# Patient Record
Sex: FEMALE | URBAN - METROPOLITAN AREA
[De-identification: names, ages, dates, MRNs, and addresses within clinical notes are randomized per-mention and may not be internally consistent; named-entity substitution may affect disease eponyms.]

---

## 2018-11-07 ENCOUNTER — APPOINTMENT (RX ONLY)
Dept: URBAN - METROPOLITAN AREA CLINIC 29 | Facility: CLINIC | Age: 43
Setting detail: DERMATOLOGY
End: 2018-11-07

## 2018-11-07 DIAGNOSIS — L81.4 OTHER MELANIN HYPERPIGMENTATION: ICD-10-CM

## 2018-11-07 DIAGNOSIS — D22 MELANOCYTIC NEVI: ICD-10-CM

## 2018-11-07 DIAGNOSIS — Z00.00 ENCOUNTER FOR GENERAL ADULT MEDICAL EXAMINATION WITHOUT ABNORMAL FINDINGS: ICD-10-CM

## 2018-11-07 DIAGNOSIS — Z71.89 OTHER SPECIFIED COUNSELING: ICD-10-CM

## 2018-11-07 PROBLEM — Z71.1 PERSON WITH FEARED HEALTH COMPLAINT IN WHOM NO DIAGNOSIS IS MADE: Status: ACTIVE | Noted: 2018-11-07

## 2018-11-07 PROBLEM — J45.909 UNSPECIFIED ASTHMA, UNCOMPLICATED: Status: ACTIVE | Noted: 2018-11-07

## 2018-11-07 PROBLEM — D22.61 MELANOCYTIC NEVI OF RIGHT UPPER LIMB, INCLUDING SHOULDER: Status: ACTIVE | Noted: 2018-11-07

## 2018-11-07 PROBLEM — L55.1 SUNBURN OF SECOND DEGREE: Status: ACTIVE | Noted: 2018-11-07

## 2018-11-07 PROBLEM — F41.9 ANXIETY DISORDER, UNSPECIFIED: Status: ACTIVE | Noted: 2018-11-07

## 2018-11-07 PROCEDURE — ? OTHER

## 2018-11-07 PROCEDURE — ? COUNSELING

## 2018-11-07 PROCEDURE — 99202 OFFICE O/P NEW SF 15 MIN: CPT

## 2018-11-07 PROCEDURE — ? SUNSCREEN RECOMMENDATIONS

## 2018-11-07 ASSESSMENT — LOCATION ZONE DERM
LOCATION ZONE: FACE
LOCATION ZONE: ARM
LOCATION ZONE: SCALP

## 2018-11-07 ASSESSMENT — LOCATION DETAILED DESCRIPTION DERM
LOCATION DETAILED: RIGHT DISTAL DORSAL FOREARM
LOCATION DETAILED: MEDIAL FRONTAL SCALP
LOCATION DETAILED: LEFT MEDIAL FOREHEAD

## 2018-11-07 ASSESSMENT — LOCATION SIMPLE DESCRIPTION DERM
LOCATION SIMPLE: LEFT FOREHEAD
LOCATION SIMPLE: FRONTAL SCALP
LOCATION SIMPLE: RIGHT FOREARM

## 2018-11-07 NOTE — PROCEDURE: OTHER
Detail Level: Detailed
Other (Free Text): fu if recurs
Note Text (......Xxx Chief Complaint.): This diagnosis correlates with the

## 2018-11-07 NOTE — HPI: SKIN LESION
How Severe Is Your Skin Lesion?: moderate
Has Your Skin Lesion Been Treated?: not been treated
Is This A New Presentation, Or A Follow-Up?: Skin Lesion
How Severe Is Your Skin Lesion?: mild
Is This A New Presentation, Or A Follow-Up?: Moles

## 2024-08-19 ENCOUNTER — APPOINTMENT (RX ONLY)
Dept: URBAN - METROPOLITAN AREA CLINIC 29 | Facility: CLINIC | Age: 49
Setting detail: DERMATOLOGY
End: 2024-08-19

## 2024-08-19 DIAGNOSIS — L82.1 OTHER SEBORRHEIC KERATOSIS: ICD-10-CM

## 2024-08-19 DIAGNOSIS — L82.0 INFLAMED SEBORRHEIC KERATOSIS: ICD-10-CM

## 2024-08-19 DIAGNOSIS — L81.0 POSTINFLAMMATORY HYPERPIGMENTATION: ICD-10-CM

## 2024-08-19 DIAGNOSIS — Z71.89 OTHER SPECIFIED COUNSELING: ICD-10-CM

## 2024-08-19 PROBLEM — D48.5 NEOPLASM OF UNCERTAIN BEHAVIOR OF SKIN: Status: ACTIVE | Noted: 2024-08-19

## 2024-08-19 PROCEDURE — ? SUNSCREEN RECOMMENDATIONS

## 2024-08-19 PROCEDURE — ? COUNSELING

## 2024-08-19 PROCEDURE — 99203 OFFICE O/P NEW LOW 30 MIN: CPT | Mod: 25

## 2024-08-19 PROCEDURE — ? BIOPSY BY SHAVE METHOD

## 2024-08-19 PROCEDURE — 11102 TANGNTL BX SKIN SINGLE LES: CPT

## 2024-08-19 ASSESSMENT — LOCATION SIMPLE DESCRIPTION DERM
LOCATION SIMPLE: LEFT PRETIBIAL REGION
LOCATION SIMPLE: CHEST
LOCATION SIMPLE: LEFT HAND

## 2024-08-19 ASSESSMENT — LOCATION ZONE DERM
LOCATION ZONE: LEG
LOCATION ZONE: HAND
LOCATION ZONE: TRUNK

## 2024-08-19 ASSESSMENT — LOCATION DETAILED DESCRIPTION DERM
LOCATION DETAILED: RIGHT LATERAL SUPERIOR CHEST
LOCATION DETAILED: LEFT DISTAL PRETIBIAL REGION
LOCATION DETAILED: LEFT ULNAR DORSAL HAND

## 2024-08-19 NOTE — PROCEDURE: BIOPSY BY SHAVE METHOD

## 2025-03-04 ENCOUNTER — APPOINTMENT (OUTPATIENT)
Dept: URBAN - METROPOLITAN AREA CLINIC 29 | Facility: CLINIC | Age: 50
Setting detail: DERMATOLOGY
End: 2025-03-04

## 2025-03-04 DIAGNOSIS — Z71.89 OTHER SPECIFIED COUNSELING: ICD-10-CM

## 2025-03-04 DIAGNOSIS — L81.4 OTHER MELANIN HYPERPIGMENTATION: ICD-10-CM

## 2025-03-04 DIAGNOSIS — D18.0 HEMANGIOMA: ICD-10-CM

## 2025-03-04 DIAGNOSIS — L81.8 OTHER SPECIFIED DISORDERS OF PIGMENTATION: ICD-10-CM

## 2025-03-04 DIAGNOSIS — D22 MELANOCYTIC NEVI: ICD-10-CM

## 2025-03-04 DIAGNOSIS — Q826 OTHER SPECIFIED ANOMALIES OF SKIN: ICD-10-CM

## 2025-03-04 DIAGNOSIS — I78.8 OTHER DISEASES OF CAPILLARIES: ICD-10-CM

## 2025-03-04 DIAGNOSIS — Q828 OTHER SPECIFIED ANOMALIES OF SKIN: ICD-10-CM

## 2025-03-04 DIAGNOSIS — L82.1 OTHER SEBORRHEIC KERATOSIS: ICD-10-CM

## 2025-03-04 DIAGNOSIS — Q819 OTHER SPECIFIED ANOMALIES OF SKIN: ICD-10-CM

## 2025-03-04 DIAGNOSIS — L71.8 OTHER ROSACEA: ICD-10-CM | Status: INADEQUATELY CONTROLLED

## 2025-03-04 DIAGNOSIS — D485 NEOPLASM OF UNCERTAIN BEHAVIOR OF SKIN: ICD-10-CM

## 2025-03-04 PROBLEM — D22.71 MELANOCYTIC NEVI OF RIGHT LOWER LIMB, INCLUDING HIP: Status: ACTIVE | Noted: 2025-03-04

## 2025-03-04 PROBLEM — D22.39 MELANOCYTIC NEVI OF OTHER PARTS OF FACE: Status: ACTIVE | Noted: 2025-03-04

## 2025-03-04 PROBLEM — D18.01 HEMANGIOMA OF SKIN AND SUBCUTANEOUS TISSUE: Status: ACTIVE | Noted: 2025-03-04

## 2025-03-04 PROBLEM — L85.8 OTHER SPECIFIED EPIDERMAL THICKENING: Status: ACTIVE | Noted: 2025-03-04

## 2025-03-04 PROBLEM — D48.5 NEOPLASM OF UNCERTAIN BEHAVIOR OF SKIN: Status: ACTIVE | Noted: 2025-03-04

## 2025-03-04 PROBLEM — D23.72 OTHER BENIGN NEOPLASM OF SKIN OF LEFT LOWER LIMB, INCLUDING HIP: Status: ACTIVE | Noted: 2025-03-04

## 2025-03-04 PROBLEM — D22.5 MELANOCYTIC NEVI OF TRUNK: Status: ACTIVE | Noted: 2025-03-04

## 2025-03-04 PROBLEM — D22.61 MELANOCYTIC NEVI OF RIGHT UPPER LIMB, INCLUDING SHOULDER: Status: ACTIVE | Noted: 2025-03-04

## 2025-03-04 PROCEDURE — ? COSMETIC CONSULTATION: BBL

## 2025-03-04 PROCEDURE — ? SUNSCREEN RECOMMENDATIONS

## 2025-03-04 PROCEDURE — ? PRESCRIPTION MEDICATION MANAGEMENT

## 2025-03-04 PROCEDURE — ? RECOMMENDATIONS

## 2025-03-04 PROCEDURE — 99214 OFFICE O/P EST MOD 30 MIN: CPT | Mod: 25

## 2025-03-04 PROCEDURE — ? PRESCRIPTION

## 2025-03-04 PROCEDURE — ? COUNSELING

## 2025-03-04 PROCEDURE — 11102 TANGNTL BX SKIN SINGLE LES: CPT

## 2025-03-04 PROCEDURE — ? BIOPSY BY SHAVE METHOD

## 2025-03-04 RX ORDER — IVERMECTIN 10 MG/G
CREAM TOPICAL QHS
Qty: 45 | Refills: 5 | Status: ERX | COMMUNITY
Start: 2025-03-04

## 2025-03-04 RX ADMIN — IVERMECTIN: 10 CREAM TOPICAL at 00:00

## 2025-03-04 ASSESSMENT — LOCATION SIMPLE DESCRIPTION DERM
LOCATION SIMPLE: ABDOMEN
LOCATION SIMPLE: SUPERIOR FOREHEAD
LOCATION SIMPLE: RIGHT BREAST
LOCATION SIMPLE: RIGHT THIGH
LOCATION SIMPLE: RIGHT CHEEK
LOCATION SIMPLE: LEFT CHEEK
LOCATION SIMPLE: LEFT POSTERIOR THIGH
LOCATION SIMPLE: LEFT PRETIBIAL REGION
LOCATION SIMPLE: LEFT UPPER BACK
LOCATION SIMPLE: RIGHT FOREHEAD
LOCATION SIMPLE: RIGHT FOREARM
LOCATION SIMPLE: RIGHT PRETIBIAL REGION
LOCATION SIMPLE: RIGHT UPPER BACK
LOCATION SIMPLE: RIGHT UPPER ARM
LOCATION SIMPLE: LEFT POSTERIOR UPPER ARM
LOCATION SIMPLE: LEFT BREAST
LOCATION SIMPLE: LEFT FOREARM
LOCATION SIMPLE: RIGHT CALF
LOCATION SIMPLE: RIGHT POSTERIOR UPPER ARM
LOCATION SIMPLE: CHEST
LOCATION SIMPLE: RIGHT POSTERIOR THIGH
LOCATION SIMPLE: RIGHT NOSE
LOCATION SIMPLE: RIGHT ELBOW
LOCATION SIMPLE: RIGHT INDEX FINGER

## 2025-03-04 ASSESSMENT — LOCATION DETAILED DESCRIPTION DERM
LOCATION DETAILED: LEFT LATERAL BREAST 5-6:00 REGION
LOCATION DETAILED: RIGHT DISTAL POSTERIOR UPPER ARM
LOCATION DETAILED: RIGHT LATERAL PROXIMAL PRETIBIAL REGION
LOCATION DETAILED: LEFT RIB CAGE
LOCATION DETAILED: RIGHT ANTERIOR DISTAL UPPER ARM
LOCATION DETAILED: RIGHT PROXIMAL DORSAL FOREARM
LOCATION DETAILED: LEFT DISTAL POSTERIOR THIGH
LOCATION DETAILED: RIGHT INFERIOR CENTRAL MALAR CHEEK
LOCATION DETAILED: SUPERIOR MID FOREHEAD
LOCATION DETAILED: RIGHT CENTRAL MALAR CHEEK
LOCATION DETAILED: LEFT PROXIMAL PRETIBIAL REGION
LOCATION DETAILED: RIGHT MEDIAL BREAST 4-5:00 REGION
LOCATION DETAILED: LEFT PROXIMAL POSTERIOR UPPER ARM
LOCATION DETAILED: EPIGASTRIC SKIN
LOCATION DETAILED: RIGHT PROXIMAL DORSAL INDEX FINGER
LOCATION DETAILED: RIGHT PROXIMAL CALF
LOCATION DETAILED: RIGHT DISTAL MEDIAL POSTERIOR THIGH
LOCATION DETAILED: LEFT SUPERIOR UPPER BACK
LOCATION DETAILED: RIGHT INFERIOR MEDIAL UPPER BACK
LOCATION DETAILED: RIGHT SUPERIOR FOREHEAD
LOCATION DETAILED: LEFT MID-UPPER BACK
LOCATION DETAILED: RIGHT ANTERIOR DISTAL THIGH
LOCATION DETAILED: RIGHT VENTRAL DISTAL FOREARM
LOCATION DETAILED: RIGHT RIB CAGE
LOCATION DETAILED: RIGHT MID-UPPER BACK
LOCATION DETAILED: RIGHT LATERAL ELBOW
LOCATION DETAILED: MIDDLE STERNUM
LOCATION DETAILED: LEFT VENTRAL DISTAL FOREARM
LOCATION DETAILED: RIGHT NARIS
LOCATION DETAILED: LEFT PROXIMAL RADIAL DORSAL FOREARM
LOCATION DETAILED: LEFT INFERIOR MEDIAL MALAR CHEEK
LOCATION DETAILED: RIGHT MEDIAL BREAST 2-3:00 REGION
LOCATION DETAILED: LEFT INFERIOR CENTRAL MALAR CHEEK

## 2025-03-04 ASSESSMENT — LOCATION ZONE DERM
LOCATION ZONE: TRUNK
LOCATION ZONE: ARM
LOCATION ZONE: NOSE
LOCATION ZONE: LEG
LOCATION ZONE: FINGER
LOCATION ZONE: FACE

## 2025-03-04 NOTE — PROCEDURE: PRESCRIPTION MEDICATION MANAGEMENT
Initiate Treatment: Soolantra 1 % topical cream QHS\\nQuantity: 45.0 g  Days Supply: 30\\nSig: Apply a thin layer to face nightly.
Detail Level: Zone
Render In Strict Bullet Format?: No

## 2025-03-04 NOTE — PROCEDURE: BIOPSY BY SHAVE METHOD
Detail Level: Detailed
Depth Of Biopsy: dermis
Was A Bandage Applied: Yes
Size Of Lesion In Cm: 0
Biopsy Type: H and E
Biopsy Method: Personna blade
Anesthesia Type: 1% lidocaine with epinephrine
Anesthesia Volume In Cc: 0.5
Hemostasis: Aluminum Chloride
Wound Care: Vaseline
Dressing: bandage
Destruction After The Procedure: No
Type Of Destruction Used: Curettage
Curettage Text: The wound bed was treated with curettage after the biopsy was performed.
Cryotherapy Text: The wound bed was treated with cryotherapy after the biopsy was performed.
Electrodesiccation Text: The wound bed was treated with electrodesiccation after the biopsy was performed.
Electrodesiccation And Curettage Text: The wound bed was treated with electrodesiccation and curettage after the biopsy was performed.
Silver Nitrate Text: The wound bed was treated with silver nitrate after the biopsy was performed.
Lab: [id number]
Lab Facility: [id number]
Medical Necessity Information: It is in your best interest to select a reason for this procedure from the list below. All of these items fulfill various CMS LCD requirements except the new and changing color options.
Consent: Written consent was obtained and risks were reviewed including but not limited to scarring, infection, bleeding, scabbing, incomplete removal, nerve damage and allergy to anesthesia.
Post-Care Instructions: I reviewed with the patient in detail post-care instructions. Patient is to keep the biopsy site dry overnight, wash daily with mild soap water and then apply Vaseline twice daily until healed.
Notification Instructions: Patient will be notified of biopsy results. However, patient instructed to call the office if not contacted within 2 weeks. Results will be faxed to PCP once they are available.
Billing Type: Third-Party Bill
Information: Selecting Yes will display possible errors in your note based on the variables you have selected. This validation is only offered as a suggestion for you. PLEASE NOTE THAT THE VALIDATION TEXT WILL BE REMOVED WHEN YOU FINALIZE YOUR NOTE. IF YOU WANT TO FAX A PRELIMINARY NOTE YOU WILL NEED TO TOGGLE THIS TO 'NO' IF YOU DO NOT WANT IT IN YOUR FAXED NOTE.

## 2025-03-12 ENCOUNTER — APPOINTMENT (OUTPATIENT)
Dept: URBAN - METROPOLITAN AREA CLINIC 29 | Facility: CLINIC | Age: 50
Setting detail: DERMATOLOGY
End: 2025-03-12

## 2025-03-12 PROBLEM — C44.319 BASAL CELL CARCINOMA OF SKIN OF OTHER PARTS OF FACE: Status: ACTIVE | Noted: 2025-03-12

## 2025-03-12 PROCEDURE — 17312 MOHS ADDL STAGE: CPT

## 2025-03-12 PROCEDURE — ? MOHS SURGERY

## 2025-03-12 PROCEDURE — 17311 MOHS 1 STAGE H/N/HF/G: CPT

## 2025-03-12 PROCEDURE — ? SURGICAL DECISION MAKING

## 2025-03-12 PROCEDURE — ? EDUCATIONAL RESOURCES PROVIDED

## 2025-03-12 PROCEDURE — 12052 INTMD RPR FACE/MM 2.6-5.0 CM: CPT

## 2025-03-12 PROCEDURE — ? RETURN TO REFERRING PROVIDER

## 2025-03-12 PROCEDURE — ? PHOTO-DOCUMENTATION

## 2025-03-12 PROCEDURE — ? COUNSELING

## 2025-03-12 NOTE — PROCEDURE: MOHS SURGERY
Same Histology In Subsequent Stages Text: The pattern and morphology of the tumor is as described in the first stage.
Melolabial Interpolation Flap Text: A decision was made to reconstruct the defect utilizing an interpolation axial flap and a staged reconstruction.  A telfa template was made of the defect.  This telfa template was then used to outline the melolabial interpolation flap.  The donor area for the pedicle flap was then injected with anesthesia.  The flap was excised through the skin and subcutaneous tissue down to the layer of the underlying musculature.  The pedicle flap was carefully excised within this deep plane to maintain its blood supply.  The edges of the donor site were undermined.   The donor site was closed in a primary fashion.  The pedicle was then rotated into position and sutured.  Once the tube was sutured into place, adequate blood supply was confirmed with blanching and refill.  The pedicle was then wrapped with xeroform gauze and dressed appropriately with a telfa and gauze bandage to ensure continued blood supply and protect the attached pedicle.
Rhomboid Transposition Flap Text: A flap repair was deemed to be medically appropriate because of the wound's location and size/depth and the need to repair the wound to correct the deformity that resulted from the excision of a malignant skin lesion. Simpler repair options were not available without introducing the possibilities of local tissue distortion or functional/aesthetic compromise. Local anesthesia was obtained. A standard sterile surgical prep and drape were performed. An transposition flap was designed.  This flap design required separate and distinct additional incisions with a scalpel adjacent to the primary surgical defect to liberate sufficient local tissue to permit wound closure. These additional incisions created a secondary surgical defect that also required wound closure.  The flap was incised sharply to the depth of the underlying adipose tissue tissue and the areas of the primary and secondary defects were undermined extensively with blunt scissor dissection. The flap was incised and then elevated. Careful attention was paid to the location of underlying important  anatomic structures.  Hemostasis was obtained using electrocoagulation. The flap was then transposed into the primary defect. Raised tissue cones (dog-ears) were removed as necessary by a standard technique. The superficial fascia and subcutaneous layers of both the primary and secondary defects were closed with buried vertical mattress sutures. The epidermis was then approximated with a running suture. Careful attention was given to wound edge eversion and even approximation of the wound edges. A sterile pressure dressing was applied.  The flap billing was determined by the sizes of the primary and the created secondary defects at the time of wound closure.
Stage 14: Number Of Blocks?: 0
O-Z Flap Text: A flap repair was deemed to be medically appropriate because of the wound's location and size/depth and the need to repair the wound to correct the deformity that resulted from the excision of a malignant skin lesion. Simpler repair options were not available without introducing the possibilities of local tissue distortion or functional/aesthetic compromise. Local anesthesia was obtained. A standard sterile surgical prep and drape were performed. An O-Z rotation flap was designed.  This flap design required separate and distinct additional incisions with a scalpel adjacent to the primary surgical defect to liberate sufficient local tissue to permit wound closure. These additional incisions created a secondary surgical defect that also required wound closure.  The flap was incised sharply to the depth of the underlying adipose tissue tissue and the areas of the primary and secondary defects were undermined extensively with blunt scissor dissection.  The flap was then elevated. Careful attention was paid to the location of underlying important  anatomic structures.  Hemostasis was obtained using electrocoagulation. The flap was then advanced into the primary defect. Raised tissue cones (dog-ears) were removed as necessary by a standard technique. The superficial fascia and subcutaneous layers of both the primary and secondary defects were closed with buried vertical mattress sutures. The epidermis was then approximated with a running suture. Careful attention was given to wound edge eversion and even approximation of the wound edges. A sterile pressure dressing was applied.  The flap billing was determined by the sizes of the primary and the created secondary defects at the time of wound closure.
Primary Defect Length In Cm (Final Defect Size - Required For Flaps/Grafts): 0.9
Use Subsequent Stages Verbiage?: Yes
Graft Donor Site Will Heal By Secondary Intention: No
Which Eyelid Repair Cpt Are You Using?: 60642
Cheek Interpolation Flap Text: A decision was made to reconstruct the defect utilizing an interpolation axial flap and a staged reconstruction.  A telfa template was made of the defect.  This telfa template was then used to outline the Cheek Interpolation flap.  The donor area for the pedicle flap was then injected with anesthesia.  The flap was excised through the skin and subcutaneous tissue down to the layer of the underlying musculature.  The interpolation flap was carefully excised within this deep plane to maintain its blood supply.  The edges of the donor site were undermined.   The donor site was closed in a primary fashion.  The pedicle was then rotated into position and sutured.  Once the tube was sutured into place, adequate blood supply was confirmed with blanching and refill.  The pedicle was then wrapped with xeroform gauze and dressed appropriately with a telfa and gauze bandage to ensure continued blood supply and protect the attached pedicle.
Consent (Near Eyelid Margin)/Introductory Paragraph: The rationale for Mohs was explained to the patient and consent was obtained. The risks, benefits and alternatives to therapy were discussed in detail. Specifically, the risks of ectropion or eyelid deformity, infection, scarring, bleeding, prolonged wound healing, incomplete removal, allergy to anesthesia, nerve injury and recurrence were addressed. Prior to the procedure, the treatment site was clearly identified and confirmed by the patient. All components of Universal Protocol/PAUSE Rule completed.
Double O-Z Plasty Text: The defect edges were debeveled with a #15 scalpel blade. Given the location of the defect, shape of the defect and the proximity to free margins a Double O-Z plasty (double transposition flap) was deemed most appropriate. Using a sterile surgical marker, the appropriate transposition flaps were drawn incorporating the defect and placing the expected incisions within the relaxed skin tension lines where possible. The area thus outlined was incised deep to adipose tissue with a #15 scalpel blade. The skin margins were undermined to an appropriate distance in all directions utilizing iris scissors. Hemostasis was achieved with electrocautery. The flaps were then transposed and carried over into place, one clockwise and the other counterclockwise, and anchored with interrupted buried subcutaneous sutures.
Scc Well Differentiated Histology Text: Lobules and detached aggregations of glassy, brightly eosinophilic keratinocytes containing nuclei with pleomorphism and mitoses as well as keratin pearls and apoptotic cells consistent with well differentiated squamous cell carcinoma were noted on microscopic analysis.
Burow's Graft Text: The defect edges were debeveled with a #15 scalpel blade. Given the location of the defect, shape of the defect, the proximity to free margins and the presence of a standing cone deformity a Burow's skin graft was deemed most appropriate. The standing cone was removed and this tissue was then trimmed to the shape of the primary defect. The adipose tissue was also removed until only dermis and epidermis were left.  The skin graft was then placed in the primary defect and oriented appropriately.
Information: Selecting Yes will display possible errors in your note based on the variables you have selected. This validation is only offered as a suggestion for you. PLEASE NOTE THAT THE VALIDATION TEXT WILL BE REMOVED WHEN YOU FINALIZE YOUR NOTE. IF YOU WANT TO FAX A PRELIMINARY NOTE YOU WILL NEED TO TOGGLE THIS TO 'NO' IF YOU DO NOT WANT IT IN YOUR FAXED NOTE.
H Plasty Text: Given the location of the defect, shape of the defect and the proximity to free margins a H-plasty was deemed most appropriate for repair. Using a sterile surgical marker, the appropriate advancement arms of the H-plasty were drawn incorporating the defect and placing the expected incisions within the relaxed skin tension lines where possible. The area thus outlined was incised deep to adipose tissue with a #15 scalpel blade. The skin margins were undermined to an appropriate distance in all directions utilizing iris scissors.  The opposing advancement arms were then advanced and carried over into place in opposite direction and anchored with interrupted buried subcutaneous sutures.
Peng Advancement Flap Text: The defect edges were debeveled with a #15 scalpel blade. Given the location of the defect, shape of the defect and the proximity to free margins a Peng advancement flap was deemed most appropriate. Using a sterile surgical marker, an appropriate advancement flap was drawn incorporating the defect and placing the expected incisions within the relaxed skin tension lines where possible. The area thus outlined was incised deep to adipose tissue with a #15 scalpel blade. The skin margins were undermined to an appropriate distance in all directions utilizing iris scissors. Following this, the designed flap was advanced and carried over into the primary defect and sutured into place.
Special Stains Stage 11 - Results: Base On Clearance Noted Above
Advancement-Rotation Flap Text: The defect edges were debeveled with a #15 scalpel blade. Given the location of the defect, shape of the defect and the proximity to free margins an advancement-rotation flap was deemed most appropriate. Using a sterile surgical marker, an appropriate flap was drawn incorporating the defect and placing the expected incisions within the relaxed skin tension lines where possible. The area thus outlined was incised deep to adipose tissue with a #15 scalpel blade. The skin margins were undermined to an appropriate distance in all directions utilizing iris scissors. Following this, the designed flap was carried over into the primary defect and sutured into place.
Advancement Flap (Double) Text: A flap repair was deemed to be medically appropriate because of the wound's location and size/depth and the need to repair the wound to correct the deformity that resulted from the excision of a malignant skin lesion. Simpler repair options were not available without introducing the possibilities of local tissue distortion or functional/aesthetic compromise. Local anesthesia was obtained. A standard sterile surgical prep and drape were performed. Appropriate bilateral advancement flaps were designed since there was insufficient tissue laxity to allow closure with a simpler closure technique. This flap design required separate and distinct additional incisions with a scalpel adjacent to the primary surgical defect to liberate sufficient local tissue to permit wound closure. These additional incisions created secondary surgical defects that also required wound closure.  The flaps were incised sharply to the depth of the underlying adipose tissue tissue and the areas of the primary and secondary defects were undermined extensively with blunt scissor dissection.  The flaps were then elevated. Careful attention was paid to the location of underlying important  anatomic structures.  Hemostasis was obtained using electrocoagulation. The flaps were then advanced into the primary defect. Raised standing tissue cones (dog-ears) were removed as necessary by a standard technique. The superficial fascia and subcutaneous layers of both the primary and secondary defects were closed with buried vertical mattress sutures. The epidermis was then approximated with a running suture. Careful attention was given to wound edge eversion and even approximation of the wound edges. A sterile pressure dressing was applied.  The flap billing was determined by the combined sizes of the primary and the created secondary defects at the time of wound closure.
Dermal Autograft Text: The defect edges were debeveled with a #15 scalpel blade. Given the location of the defect, shape of the defect and the proximity to free margins a dermal autograft was deemed most appropriate. Using a sterile surgical marker, the primary defect shape was transferred to the donor site. The area thus outlined was incised deep to adipose tissue with a #15 scalpel blade.  The harvested graft was then trimmed of adipose and epidermal tissue until only dermis was left.  The skin graft was then placed in the primary defect and oriented appropriately.
Oculoplastic Surgeon Procedure Text (A): After obtaining clear surgical margins the patient was sent to oculoplastics for surgical repair.  The patient understands they will receive post-surgical care and follow-up from the referring physician's office.
Stage 11: Additional Anesthesia Type: 1% lidocaine with epinephrine
Consent 2/Introductory Paragraph: Mohs surgery was explained to the patient and consent was obtained. The risks, benefits and alternatives to therapy were discussed in detail. Specifically, the risks of infection, scarring, bleeding, prolonged wound healing, incomplete removal, allergy to anesthesia, nerve injury and recurrence were addressed. Prior to the procedure, the treatment site was clearly identified and confirmed by the patient. All components of Universal Protocol/PAUSE Rule completed.
Hatchet Flap Text: The defect edges were debeveled with a #15 scalpel blade. Given the location of the defect, shape of the defect and the proximity to free margins a hatchet flap was deemed most appropriate. Using a sterile surgical marker, an appropriate hatchet flap was drawn incorporating the defect and placing the expected incisions within the relaxed skin tension lines where possible. The area thus outlined was incised deep to adipose tissue with a #15 scalpel blade. The skin margins were undermined to an appropriate distance in all directions utilizing iris scissors. Following this, the designed flap was carried over into the primary defect and sutured into place.
Keystone Flap Text: The defect edges were debeveled with a #15 scalpel blade. Given the location of the defect, shape of the defect a keystone flap was deemed most appropriate. Using a sterile surgical marker, an appropriate keystone flap was drawn incorporating the defect, outlining the appropriate donor tissue and placing the expected incisions within the relaxed skin tension lines where possible. The area thus outlined was incised deep to adipose tissue with a #15 scalpel blade. The skin margins were undermined to an appropriate distance in all directions around the primary defect and laterally outward around the flap utilizing iris scissors. Following this, the designed flap was carried into the primary defect and sutured into place.
Star Wedge Flap Text: The defect edges were debeveled with a #15 scalpel blade. Given the location of the defect, shape of the defect and the proximity to free margins a star wedge flap was deemed most appropriate. Using a sterile surgical marker, an appropriate rotation flap was drawn incorporating the defect and placing the expected incisions within the relaxed skin tension lines where possible. The area thus outlined was incised deep to adipose tissue with a #15 scalpel blade. The skin margins were undermined to an appropriate distance in all directions utilizing iris scissors. Following this, the designed flap was carried over into the primary defect and sutured into place.
Mohs Histo Method Verbiage: A reference map was drawn during the excision, and the excised tissue was cut into sections for frozen sectioning in the Mohs histologic laboratory. Two adjacent edges of each section were then dyed/chromacoded in order to achieve precise orientation. Red, blue, green, and yellow dyes were used, correlating to colored pencil marking on the Mohs surgical map. The tissue sections were processed using horizontal sectioning of the base and continuous peripheral margins. Hematoxylin and eosin histochemical stains were utilized by the Mohs lab. The Mohs surgeon then examined the prepared microscopic frozen sections in conjunction with the Mohs reference map. Any areas of residual tumor were indicated on the reference map with red pencil, pinpointing the location in which further tissue excision was necessary.
Number Of Stages: 1
Lazy S Complex Repair Preamble Text (Leave Blank If You Do Not Want): Extensive wide undermining was performed.
Mid-Level Procedure Text (D): After obtaining clear surgical margins the patient was sent to a mid-level provider for surgical repair.  The patient understands they will receive post-surgical care and follow-up from the mid-level provider.
Intermediate Repair And Graft Additional Text (Will Appearing After The Standard Complex Repair Text): The intermediate repair was not sufficient to completely close the primary defect. The remaining additional defect was repaired with the graft mentioned below.
Eyelid Full Thickness Repair - 68603: The eyelid defect was full thickness which required a wedge repair of the eyelid. Special care was taken to ensure that the eyelid margin was realligned when placing sutures.
Incidental Basaloid Follicular Hamartoma Histology Text: Superficial dermal proliferation of interconnecting basaloid cells that are folliculocentric and vertical oriented with connection to the epidermis consistent with basaloid follicular hamartoma was noted on microscopic analysis . The cells are surrounded by loose stroma that lacks mucin. There is minimal peripheral palisading, minimal to no inflammation, and mitotic inactivity.
Incidental Squamous Cell Carcinoma In Situ Histology Text: Full-thickness atypia of the epidermis with dyskeratosis, nuclear pleomorphism, and frequent mitoses consistent with squamous cell carcinoma in situ was noted on microscopic analysis.  Diffuse confluent parakeratosis is also observed.
Bill 59 Modifier?: No - Continue to Bill 79 Modifier
Cheiloplasty (Less Than 50%) Text: A decision was made to reconstruct the defect with a  cheiloplasty.  The defect was undermined extensively.  Additional orbicularis oris muscle was excised with a 15 blade scalpel.  The defect was converted into a full thickness wedge, of less than 50% of the vertical height of the lip, to facilite a better cosmetic result.  Small vessels were then tied off with 5-0 monocyrl. The orbicularis oris, superficial fascia, adipose and dermis were then reapproximated.  After the deeper layers were approximated the epidermis was reapproximated with particular care given to realign the vermilion border.
Provider Procedure Text (D): After obtaining clear surgical margins the defect was repaired by another provider.
Lazy S Intermediate Repair Preamble Text (Leave Blank If You Do Not Want): Undermining was performed with blunt dissection.
Asc Procedure Text (B): After obtaining clear surgical margins the patient was sent to an ASC for surgical repair.  The patient understands they will receive post-surgical care and follow-up from the ASC physician.
Bilateral Helical Rim Advancement Flap Text: The defect edges were debeveled with a #15 blade scalpel.  Given the location of the defect and the proximity to free margins (helical rim) a bilateral helical rim advancement flap was deemed most appropriate. Using a sterile surgical marker, the appropriate advancement flaps were drawn incorporating the defect and placing the expected incisions between the helical rim and antihelix where possible.  The area thus outlined was incised through and through with a #15 scalpel blade.  With a skin hook and iris scissors, the flaps were gently and sharply undermined and freed up. Following this, the designed flaps were placed into the primary defect and sutured into place.
Mart-1 - Positive Histology Text: MART-1 staining demonstrates areas of higher density and clustering of melanocytes with Pagetoid spread upwards within the epidermis. The surgical margins are positive for tumor cells.
O-Z Plasty Text: The defect edges were debeveled with a #15 scalpel blade. Given the location of the defect, shape of the defect and the proximity to free margins an O-Z plasty (double transposition flap) was deemed most appropriate. Using a sterile surgical marker, the appropriate transposition flaps were drawn incorporating the defect and placing the expected incisions within the relaxed skin tension lines where possible. The area thus outlined was incised deep to adipose tissue with a #15 scalpel blade. The skin margins were undermined to an appropriate distance in all directions utilizing iris scissors. Hemostasis was achieved with electrocautery. The flaps were then transposed and carried over into place, one clockwise and the other counterclockwise, and anchored with interrupted buried subcutaneous sutures.
Alternatives Discussed Intro (Do Not Add Period): I discussed alternative treatments to Mohs surgery and specifically discussed the risks and benefits of
Mucosal Advancement Flap Text: Given the location of the defect, shape of the defect and the proximity to free margins a mucosal advancement flap was deemed most appropriate. Incisions were made with a 15 blade scalpel in the appropriate fashion along the cutaneous vermilion border and the mucosal lip. The remaining actinically damaged mucosal tissue was excised.  The mucosal advancement flap was then elevated to the gingival sulcus with care taken to preserve the neurovascular structures and advanced into the primary defect. Care was taken to ensure that precise realignment of the vermilion border was achieved.
Cartilage Graft Text: The defect edges were debeveled with a #15 scalpel blade. Given the location of the defect, shape of the defect, the fact the defect involved a full thickness cartilage defect a cartilage graft was deemed most appropriate.  An appropriate donor site was identified, cleansed, and anesthetized. The cartilage graft was then harvested and transferred to the recipient site, oriented appropriately and then sutured into place.  The secondary defect was then repaired using a primary closure.
Modified Advancement Flap Text: The defect edges were debeveled with a #15 scalpel blade. Given the location of the defect, shape of the defect and the proximity to free margins a modified advancement flap was deemed most appropriate. Using a sterile surgical marker, an appropriate advancement flap was drawn incorporating the defect and placing the expected incisions within the relaxed skin tension lines where possible. The area thus outlined was incised deep to adipose tissue with a #15 scalpel blade. The skin margins were undermined to an appropriate distance in all directions utilizing iris scissors. Following this, the designed flap was advanced and carried over into the primary defect and sutured into place.
Medical Necessity Statement: Based on my medical judgement, Mohs surgery is the most appropriate treatment for this cancer compared to other treatments.
Closure 3 Information: This tab is for additional flaps and grafts above and beyond our usual structured repairs.  Please note if you enter information here it will not currently bill and you will need to add the billing information manually.
O-T Plasty Text: The defect edges were debeveled with a #15 scalpel blade. Given the location of the defect, shape of the defect and the proximity to free margins an O-T plasty was deemed most appropriate. Using a sterile surgical marker, an appropriate O-T plasty was drawn incorporating the defect and placing the expected incisions within the relaxed skin tension lines where possible. The area thus outlined was incised deep to adipose tissue with a #15 scalpel blade. The skin margins were undermined to an appropriate distance in all directions utilizing iris scissors. Following this, the designed flap was carried over into the primary defect and sutured into place.
Island Pedicle Flap Text: A flap repair was deemed to be medically appropriate because of the wound's location and size/depth. More simple repair options were not available without introducing the possibilities of local tissue distortion or functional/aesthetic compromise. Local anesthesia was obtained. An island pedicle flap was designed. This flap design required separate and distinct additional incisions with a scalpel adjacent to the primary surgical defect to liberate sufficient local tissue to permit wound closure.  These additional incisions created a secondary surgical defect that also required wound closure. The flap was incised to the underlying adipose tissue, and the tissue surrounding the operative site at the flap's donor location was undermined extensively with blunt scissor dissection. The flap was also undermined in the subcutaneous fat at each pole to increase mobility leaving the center based on a single vascular pedicle with a named arterial source (the posterior auricular artery). The vascular pedicle included musculature in order to promote viability of the flap. Hemostasis was obtained using electro-coagulation. The island pedicle flap was tunneled through the ear and secured into place with appropriate sutures. At the donor site, the superficial fascia and subcutaneous layers were closed with buried vertical mattress sutures.Raised tissue cones (dog-ears) were removed as necessary by a standard technique. The epidermis was then approximated with simple interrupted or running sutures. Careful attention was given to eversion and even approximation of the edges. A pressure dressing was applied. The flap billing was determined by the sizes of the primary and the created secondary defects at the time of wound closure.
Bcc Adenoid Histology Text: Basaloid cells arranged in intertwining strands in a lace-like pattern suggesting tubular, gland-like structures consistent with adenoid basal call carcinoma were noted on microscopic analysis.
Otolaryngologist Procedure Text (D): After obtaining clear surgical margins the patient was sent to otolaryngology for surgical repair.  The patient understands they will receive post-surgical care and follow-up from the referring physician's office.
Tumor Depth: Less than 6mm from granular layer and no invasion beyond the subcutaneous fat
Suturegard Retention Suture: 2-0 Nylon
Home Suture Removal Text: Patient was provided instructions on removing sutures and will remove their sutures at home.  If they have any questions or difficulties they will call the office.
Rectangular Flap Text: The defect edges were debeveled with a #15 scalpel blade. Given the location of the defect and the proximity to free margins a rectangular flap was deemed most appropriate. Using a sterile surgical marker, an appropriate rectangular flap was drawn incorporating the defect. The area thus outlined was incised deep to adipose tissue with a #15 scalpel blade. The skin margins were undermined to an appropriate distance in all directions utilizing iris scissors. Following this, the designed flap was carried over into the primary defect and sutured into place.
Epidermal Closure Graft Donor Site (Optional): simple interrupted
Mustarde Flap Text: The defect edges were debeveled with a #15 scalpel blade.  Given the size, depth and location of the defect and the proximity to free margins a Mustarde flap was deemed most appropriate. Using a sterile surgical marker, an appropriate flap was drawn incorporating the defect. The area thus outlined was incised with a #15 scalpel blade. The skin margins were undermined to an appropriate distance in all directions utilizing iris scissors. Following this, the designed flap was carried into the primary defect and sutured into place.
Nasolabial Transposition Flap Text: A flap repair was deemed to be medically appropriate because of the wound's location and size/depth. Simpler repair options were not available without introducing the possibilities of local tissue distortion or functional/aesthetic compromise. A nasolabial transposition flap was designed as a single stage procedure. The incision lines were designed along the nasolabial fold and medial cheek. This flap design required separate and distinct additional incisions with a scalpel adjacent to the primary surgical defect to liberate sufficient local tissue to permit wound closure. These additional incisions created a secondary surgical defect that also required wound closure. Removal of the superior tissue cone was designed by triangulation with the apex pointed toward the area of the medial canthus. Local anesthesia was obtained. The flap was incised to the underlying adipose tissue. The flap was then undermined and elevated. The tissue surrounding the operative site was undermined extensively with blunt scissor dissection. On the nose, this was performed at the level of the periosteum or perichondrium. Hemostasis was obtained using electrocoagulation. The flap was then defatted using blunt scissor dissection. The cheek was then advanced medially with several tacking sutures, moving the flap into the primary surgical defect. The nasolabial fold was recreated by a  tacking suture placed parallel at the base of the flap. The secondary defect was then closed by complex layered closure. The primary defect wound bed was trimmed as necessary to create a concave surface as the flap was sutured into place. The superficial fascia and subcutaneous layers were closed with buried vertical mattress sutures. The epidermis was approximated with running sutures. Careful attention was given to eversion and even approximation of the wound edges.   A sterile pressure dressing was applied.  The flap billing was determined by the sizes of the primary and the created secondary defects at the time of wound
Area M Indication Text: Tumors in this location are included in Area M (cheek, forehead, scalp, neck, jawline and pretibial skin).  Mohs surgery is indicated for tumors in these anatomic locations.
Estimated Blood Loss (Cc): minimal
Referred To Plastics For Closure Text (Leave Blank If You Do Not Want): After obtaining clear surgical margins the patient was sent to plastics for surgical repair.  The patient understands they will receive post-surgical care and follow-up from the referring physician's office.
Length To Time In Minutes Device Was In Place: 10
Scc In Situ With Follicular Extension Histology Text: Full-thickness atypia of the epidermis with dyskeratosis, nuclear pleomorphism, and frequent mitoses extending down follicular adnexa consistent with squamous cell carcinoma in situ with follicular extension was noted on microscopic analysis.  Diffuse confluent parakeratosis is also observed.
Nasalis-Muscle-Based Myocutaneous Island Pedicle Flap Text: Using a #15 blade, an incision was made around the donor flap to the level of the nasalis muscle. Wide lateral undermining was then performed in both the subcutaneous plane above the nasalis muscle, and in a submuscular plane just above periosteum. This allowed the formation of a free nasalis muscle axial pedicle (based on the angular artery) which was still attached to the actual cutaneous flap, increasing its mobility and vascular viability. Hemostasis was obtained with pinpoint electrocoagulation. The flap was mobilized into position and the pivotal anchor points positioned and stabilized with buried interrupted sutures. Subcutaneous and dermal tissues were closed in a multilayered fashion with sutures. Tissue redundancies were excised, and the epidermal edges were apposed without significant tension and sutured with sutures.
Mohs Rapid Report Verbiage: The area of clinically evident tumor was marked with skin marking ink and appropriately hatched.  The initial incision was made following the Mohs approach through the skin.  The specimen was taken to the lab, divided into the necessary number of pieces, chromacoded and processed according to the Mohs protocol.  This was repeated in successive stages until a tumor free defect was achieved.
Deep Sutures: 4-0 Vicryl
Interpolation Flap Text: A decision was made to reconstruct the defect utilizing an interpolation axial flap and a staged reconstruction.  A telfa template was made of the defect.  This telfa template was then used to outline the interpolation flap.  The donor area for the pedicle flap was then injected with anesthesia.  The flap was excised through the skin and subcutaneous tissue down to the layer of the underlying musculature.  The interpolation flap was carefully excised within this deep plane to maintain its blood supply.  The edges of the donor site were undermined.   The donor site was closed in a primary fashion.  The pedicle was then rotated into position and sutured.  Once the tube was sutured into place, adequate blood supply was confirmed with blanching and refill.  The pedicle was then wrapped with xeroform gauze and dressed appropriately with a telfa and gauze bandage to ensure continued blood supply and protect the attached pedicle.
Z Plasty Text: The lesion was extirpated to the level of the fat with a #15 scalpel blade. Given the location of the defect, shape of the defect and the proximity to free margins a Z-plasty was deemed most appropriate for repair. Using a sterile surgical marker, the appropriate transposition arms of the Z-plasty were drawn incorporating the defect and placing the expected incisions within the relaxed skin tension lines where possible. The area thus outlined was incised deep to adipose tissue with a #15 scalpel blade. The skin margins were undermined to an appropriate distance in all directions utilizing iris scissors. The opposing transposition arms were then transposed and carried over into place in opposite direction and anchored with interrupted buried subcutaneous sutures.
Nostril Rim Text: The closure involved the nostril rim.
Composite Graft Text: The defect edges were debeveled with a #15 scalpel blade. Given the location of the defect, shape of the defect, the proximity to free margins and the fact the defect was full thickness a composite graft was deemed most appropriate.  The defect was outline and then transferred to the donor site.  A full thickness graft was then excised from the donor site. The graft was then placed in the primary defect, oriented appropriately and then sutured into place.  The secondary defect was then repaired using a primary closure.
Transposition Flap Text: The defect edges were debeveled with a #15 scalpel blade. Given the location of the defect and the proximity to free margins a transposition flap was deemed most appropriate. Using a sterile surgical marker, an appropriate transposition flap was drawn incorporating the defect. The area thus outlined was incised deep to adipose tissue with a #15 scalpel blade. The skin margins were undermined to an appropriate distance in all directions utilizing iris scissors. Following this, the designed flap was carried over into the primary defect and sutured into place.
Suture Removal: 7 days
Incidental Actinic Keratosis Histology Text: There is partial thickness involvement of the epidermis by atypical keratinocytes displaying nuclear pleomorphism, especially in the basal layer, and disordered maturation consistent with actinic keratosis.
Vermilion Border Text: The closure involved the vermilion border.
Perineural Invasion (For Histology - Be Specific If Possible): absent
Skin Substitute Text: The defect edges were debeveled with a #15 scalpel blade. Given the location of the defect, shape of the defect and the proximity to free margins a skin substitute graft was deemed most appropriate.  The graft material was trimmed to fit the size of the defect. The graft was then placed in the primary defect and oriented appropriately.
Debridement Text: The wound edges were debrided prior to proceeding with the closure to facilitate wound healing.
Orbicularis Oris Muscle Flap Text: The defect edges were debeveled with a #15 scalpel blade.  Given that the defect affected the competency of the oral sphincter an orbicularis oris muscle flap was deemed most appropriate to restore this competency and normal muscle function.  Using a sterile surgical marker, an appropriate flap was drawn incorporating the defect. The area thus outlined was incised with a #15 scalpel blade. Following this, the designed flap was carried over into the primary defect and sutured into place.
Xenograft Text: The defect edges were debeveled with a #15 scalpel blade. Given the location of the defect, shape of the defect and the proximity to free margins a xenograft was deemed most appropriate.  The graft was then trimmed to fit the size of the defect.  The graft was then placed in the primary defect and oriented appropriately.
Bcc Infiltrative Histology Text: Thin bundles of basaloid cells with nest-like configuration located between the collagenous fibers of the dermis and infiltrating in the depth consistent with infiltrative basal cell carcinoma were noted on microscopic analysis.
Mauc Instructions: By selecting yes to the question below the MAUC number will be added into the note.  This will be calculated automatically based on the diagnosis chosen, the size entered, the body zone selected (H,M,L) and the specific indications you chose. You will also have the option to override the Mohs AUC if you disagree with the automatically calculated number and this option is found in the Case Summary tab.
No Residual Tumor Seen Histology Text: At this point, no further tumor cells were identified and tumor eradication was considered to be complete after the above noted stages of surgery in which multiple microscopic slices of tissue sections had been examined.
Abbe Flap (Upper To Lower Lip) Text: The defect of the lower lip was assessed and measured.  Given the location and size of the defect, an Abbe flap was deemed most appropriate. Using a sterile surgical marker, an appropriate Abbe flap was measured and drawn on the upper lip. Local anesthesia was then infiltrated.  A scalpel was then used to incise the upper lip through and through the skin, vermilion, muscle and mucosa, leaving the flap pedicled on the opposite side.  The flap was then rotated and transferred to the lower lip defect.  The flap was then sutured into place with a three layer technique, closing the orbicularis oris muscle layer with subcutaneous buried sutures, followed by a mucosal layer and an epidermal layer.
Is There Documentation In The Chart Showing Discussion Of Changes With Another Physician?: Please Select the Appropriate Response
Eye Protection Verbiage: Before proceeding with the stage, a plastic scleral shield was inserted. The globe was anesthetized with a few drops of 1% lidocaine with 1:100,000 epinephrine. Then, an appropriate sized scleral shield was chosen and coated with lacrilube ointment. The shield was gently inserted and left in place for the duration of each stage. After the stage was completed, the shield was gently removed.
Spiral Flap Text: The defect edges were debeveled with a #15 scalpel blade. Given the location of the defect, shape of the defect and the proximity to free margins a spiral flap was deemed most appropriate. Using a sterile surgical marker, an appropriate rotation flap was drawn incorporating the defect and placing the expected incisions within the relaxed skin tension lines where possible. The area thus outlined was incised deep to adipose tissue with a #15 scalpel blade. The skin margins were undermined to an appropriate distance in all directions utilizing iris scissors. Following this, the designed flap was carried over into the primary defect and sutured into place.
Basosquamous Cell Carcinoma Histology Text: Basaloid cells with peripheral palisading and eosinophilic cells with slightly pleomorphic nuclei and foci of keratinization (“horn pearls”) are present consistent with basosquamous carcinoma were noted on microscopic analysis.
Pinch Graft Text: The defect edges were debeveled with a #15 scalpel blade. Given the location of the defect, shape of the defect and the proximity to free margins a pinch graft was deemed most appropriate. Using a sterile surgical marker, the primary defect shape was transferred to the donor site. The area thus outlined was incised deep to adipose tissue with a #15 scalpel blade.  The harvested graft was then trimmed of adipose tissue until only dermis and epidermis was left. The skin graft was then placed in the primary defect and oriented appropriately.
Where Do You Want The Question To Include Opioid Counseling Located?: Case Summary Tab
Location Indication Override (Is Already Calculated Based On Selected Body Location): Area M
Pain Refusal Text: I offered to prescribe pain medication but the patient refused to take this medication.
Referring Physician (Optional): Lexii Miller D.O.
Banner Transposition Flap Text: The defect edges were debeveled with a #15 scalpel blade. Given the location of the defect and the proximity to free margins a Banner transposition flap was deemed most appropriate. Using a sterile surgical marker, an appropriate flap was drawn around the defect. The area thus outlined was incised deep to adipose tissue with a #15 scalpel blade. The skin margins were undermined to an appropriate distance in all directions utilizing iris scissors. Following this, the designed flap was carried into the primary defect and sutured into place.
Post-Care Instructions: I reviewed with the patient in detail post-care instructions. Patient is not to engage in any heavy lifting, exercise, or swimming for the next 14 days. Should the patient develop any fevers, chills, bleeding, severe pain patient will contact the office immediately.
Incidental Superficial Basal Cell Carcinoma Histology Text: Basaloid buds from the epidermis with stromal retraction spaces consistent with superficial basal cell carcinoma were noted on microscopic analysis.
Fibroepithelioma Of Pinkus Histology Text: Thin anastomosing strands of basaloid cells attached to the epidermis and embedded in loose stroma consistent with fibroepithelioma of pinkus basal cell carcinoma were noted on microscopic analysis.
Graft Cartilage Fenestration Text: The cartilage was fenestrated with a 2mm punch biopsy to help facilitate graft survival and healing.
Mohs Case Number: [id number]
X Size Of Lesion In Cm (Optional): 0.5
Missing Epidermis Histology Text: There was a portion of missing epidermis from the tissue that did not become apparent despite the examination of multiple microscopic slices of tissue sections.
Nasal Turnover Hinge Flap Text: The defect edges were debeveled with a #15 scalpel blade.  Given the size, depth, location of the defect and the defect being full thickness a nasal turnover hinge flap was deemed most appropriate. Using a sterile surgical marker, an appropriate hinge flap was drawn incorporating the defect. The area thus outlined was incised with a #15 scalpel blade. The flap was designed to recreate the nasal mucosal lining and the alar rim. The skin margins were undermined to an appropriate distance in all directions utilizing iris scissors. Following this, the designed flap was carried over into the primary defect and sutured into place
Zygomaticofacial Flap Text: Given the location of the defect, shape of the defect and the proximity to free margins a zygomaticofacial flap was deemed most appropriate for repair. Using a sterile surgical marker, the appropriate flap was drawn incorporating the defect and placing the expected incisions within the relaxed skin tension lines where possible. The area thus outlined was incised deep to adipose tissue with a #15 scalpel blade with preservation of a vascular pedicle.  The skin margins were undermined to an appropriate distance in all directions utilizing iris scissors. The flap was then carried over into the defect and anchored with interrupted buried subcutaneous sutures.
Cheiloplasty (Complex) Text: A decision was made to reconstruct the defect with a  cheiloplasty.  The defect was undermined extensively.  Additional orbicularis oris muscle was excised with a 15 blade scalpel.  The defect was converted into a full thickness wedge to facilite a better cosmetic result.  Small vessels were then tied off with 5-0 monocyrl. The orbicularis oris, superficial fascia, adipose and dermis were then reapproximated.  After the deeper layers were approximated the epidermis was reapproximated with particular care given to realign the vermilion border.
Brow Lift Text: A midfrontal incision was made medially to the defect to allow access to the tissues just superior to the left eyebrow. Following careful dissection inferiorly in a supraperiosteal plane to the level of the left eyebrow, several 3-0 monocryl sutures were used to resuspend the eyebrow orbicularis oculi muscular unit to the superior frontal bone periosteum. This resulted in an appropriate reapproximation of static eyebrow symmetry and correction of the left brow ptosis.
Bcc Macronodular Histology Text: Large aggregates of basophilic cells within mucinous stroma  by normal intervening collagen consistent with macronodular basal cell carcinoma were noted on microscopic analysis.
Dressing: pressure dressing
Repair Type: Intermediate Layered Repair
Posterior Auricular Interpolation Flap Text: A decision was made to reconstruct the defect utilizing an interpolation axial flap and a staged reconstruction.  A telfa template was made of the defect.  This telfa template was then used to outline the posterior auricular interpolation flap.  The donor area for the pedicle flap was then injected with anesthesia.  The flap was excised through the skin and subcutaneous tissue down to the layer of the underlying musculature.  The pedicle flap was carefully excised within this deep plane to maintain its blood supply.  The edges of the donor site were undermined.   The donor site was closed in a primary fashion.  The pedicle was then rotated into position and sutured.  Once the tube was sutured into place, adequate blood supply was confirmed with blanching and refill.  The pedicle was then wrapped with xeroform gauze and dressed appropriately with a telfa and gauze bandage to ensure continued blood supply and protect the attached pedicle.
Localized Dermabrasion With 15 Blade Text: The patient was draped in routine manner.  Localized dermabrasion using a 15 blade was performed in routine manner to papillary dermis. This spot dermabrasion is being performed to complete skin cancer reconstruction. It also will eliminate the other sun damaged precancerous cells that are known to be part of the regional effect of a lifetime's worth of sun exposure. This localized dermabrasion is therapeutic and should not be considered cosmetic in any regard.
Double Z Plasty Text: The lesion was extirpated to the level of the fat with a #15 scalpel blade. Given the location of the defect, shape of the defect and the proximity to free margins a double Z-plasty was deemed most appropriate for repair. Using a sterile surgical marker, the appropriate transposition arms of the double Z-plasty were drawn incorporating the defect and placing the expected incisions within the relaxed skin tension lines where possible. The area thus outlined was incised deep to adipose tissue with a #15 scalpel blade. The skin margins were undermined to an appropriate distance in all directions utilizing iris scissors. The opposing transposition arms were then transposed and carried over into place in opposite direction and anchored with interrupted buried subcutaneous sutures.
Eyelid Partial Thickness Repair - 70025: The eyelid defect was partial thickness which required a wedge repair of the eyelid. Special care was taken to ensure that the eyelid margin was realligned when placing sutures.
Scc Histology Text: Lobules and detached aggregations of glassy, brightly eosinophilic keratinocytes containing nuclei with pleomorphism and mitoses as well as keratin pearls and apoptotic cells consistent with squamous cell carcinoma were noted on microscopic analysis.
Inflammation Suggestive Of Cancer Camouflage Histology Text: There was a dense lymphocytic infiltrate which prevented adequate histologic evaluation of adjacent structures.
Trilobed Flap Text: A flap repair was deemed to be medically appropriate because of the wound's location and size/depth and the need to repair the wound to correct the deformity that resulted from the excision of a malignant skin lesion. Simpler repair options were not available without introducing the possibilities of local tissue distortion or functional/aesthetic compromise. A modified trilobed transposition flap was designed. This flap design required separate and distinct additional incisions with a scalpel adjacent to the primary surgical defect to liberate sufficient local tissue to permit wound closure. These additional incisions created a secondary and a tertiary surgical defect that also required wound closure. Local anesthesia was obtained. A standard sterile surgical prep and drape were performed. The trilobed flap was incised to the underlying perichondrial or periosteal tissue. The flap was then undermined and elevated to its pedicle base. Careful attention was paid to preserve the delicate vascular/muscular base of the flap. The entire tissue surrounding the operative site was undermined extensively with blunt scissor dissection.  Hemostasis was obtained using electrocoagulation. The primary lobe was then thinned using sharp scissor dissection. The fourth defect was initially closed with buried sutures. The secondary lobe was transposed to cover the secondary defect created by the first lobe of the flap. The primary lobe of the flap was then transposed over the primary defect. The teriary lobe of the flap was transposed to cover the teriary defect. All three lobes of the flap were trimmed to fit the recipient sites as needed. The superficial fascia and subcutaneous layers were closed with buried vertical mattress sutures. The epidermis was approximated with a running suture. Careful attention was given to eversion and even approximation of the wound edges. A sterile pressure dressing was applied. The flap billing was determined by the sizes of the primary and the created secondary defects at the time of wound closure.
Localized Dermabrasion With Wire Brush Text: The patient was draped in routine manner.  Localized dermabrasion using 3 x 17 mm wire brush was performed in routine manner to papillary dermis. This spot dermabrasion is being performed to complete skin cancer reconstruction. It also will eliminate the other sun damaged precancerous cells that are known to be part of the regional effect of a lifetime's worth of sun exposure. This localized dermabrasion is therapeutic and should not be considered cosmetic in any regard.
Consent (Nose)/Introductory Paragraph: The rationale for Mohs was explained to the patient and consent was obtained. The risks, benefits and alternatives to therapy were discussed in detail. Specifically, the risks of nasal deformity, changes in the flow of air through the nose, infection, scarring, bleeding, prolonged wound healing, incomplete removal, allergy to anesthesia, nerve injury and recurrence were addressed. Prior to the procedure, the treatment site was clearly identified and confirmed by the patient. All components of Universal Protocol/PAUSE Rule completed.
W Plasty Text: The lesion was extirpated to the level of the fat with a #15 scalpel blade. Given the location of the defect, shape of the defect and the proximity to free margins a W-plasty was deemed most appropriate for repair. Using a sterile surgical marker, the appropriate transposition arms of the W-plasty were drawn incorporating the defect and placing the expected incisions within the relaxed skin tension lines where possible. The area thus outlined was incised deep to adipose tissue with a #15 scalpel blade. The skin margins were undermined to an appropriate distance in all directions utilizing iris scissors. The opposing transposition arms were then transposed and carried over into place in opposite direction and anchored with interrupted buried subcutaneous sutures.
Complex Repair And Flap Additional Text (Will Appearing After The Standard Complex Repair Text): The complex repair was not sufficient to completely close the primary defect. The remaining additional defect was repaired with the flap mentioned below.
Helical Rim Text: The closure involved the helical rim.
Hemostasis: Electrocautery
Which Instrument Did You Use For Dermabrasion?: Wire Brush
Cheek-To-Nose Interpolation Flap Text: A decision was made to reconstruct the defect utilizing an interpolation axial flap and a staged reconstruction.  A telfa template was made of the defect.  This telfa template was then used to outline the Cheek-To-Nose Interpolation flap.  The donor area for the pedicle flap was then injected with anesthesia.  The flap was excised through the skin and subcutaneous tissue down to the layer of the underlying musculature.  The interpolation flap was carefully excised within this deep plane to maintain its blood supply.  The edges of the donor site were undermined.   The donor site was closed in a primary fashion.  The pedicle was then rotated into position and sutured.  Once the tube was sutured into place, adequate blood supply was confirmed with blanching and refill.  The pedicle was then wrapped with xeroform gauze and dressed appropriately with a telfa and gauze bandage to ensure continued blood supply and protect the attached pedicle.
Dressing (No Sutures): dry sterile dressing
Bcc Infundibulocystic Histology Text: Anastomosing cords of basophilic cells comprised with infundibular cysts filled with keratin consistent with infundibulocystic basal cell carcinoma were noted on microscopic analysis.
Purse String (Intermediate) Text: Given the location of the defect and the characteristics of the surrounding skin a purse string intermediate closure was deemed most appropriate.  Undermining was performed circumferentially around the surgical defect.  A purse string suture was then placed and tightened.
Hemigard Postcare Instructions: The HEMIGARD strips are to remain completely dry for at least 5-7 days.
Depth Of Tumor Invasion (For Histology): adipose tissue
Bcc  Morpheaform/Sclerosing Histology Text: Small angulated nests and cords of basaloid cells within a sclerotic stroma that invades deeply consistent with morpheaform basal cell carcinoma were noted on microscopic analysis.
Consent (Lip)/Introductory Paragraph: The rationale for Mohs was explained to the patient and consent was obtained. The risks, benefits and alternatives to therapy were discussed in detail. Specifically, the risks of lip deformity, changes in the oral aperture, infection, scarring, bleeding, prolonged wound healing, incomplete removal, allergy to anesthesia, nerve injury and recurrence were addressed. Prior to the procedure, the treatment site was clearly identified and confirmed by the patient. All components of Universal Protocol/PAUSE Rule completed.
Surgeon/Pathologist Verbiage (Will Incorporate Name Of Surgeon From Intro If Not Blank): operated in two distinct and integrated capacities as the surgeon and pathologist.
Retention Suture Bite Size: 3 mm
Complex Repair Preamble Text (Leave Blank If You Do Not Want): Local anesthesia was obtained.  A standard sterile surgical prep and drape were performed.  The existing Mohs beveled edges were sharply debrided to allow 90 degree wound edges. The measured width of the wound was the measured diameter of the wound perpendicular to the line of closing. The tissue surrounding the operative site/primary surgical defect was undermined extensively with blunt scissor dissection.  This extensive undermining was at least the maximum width of the primary surgical defect in at least one direction along an entire wound edge. The orientation of the closure was placed within the relaxed skin tension lines where appropriate.  The existing surgical wound was typically irregular in shape, and any irregularities and redundancies were removed in order to facilitate an aesthetically appropriately closure.  The superficial fascia and subcutaneous layers were first closed using buried vertical mattress sutures.  The epidermis was then approximated with running sutures.  Careful attention was given to eversion and even approximation of the edges in order to achieve maximal functional and cosmetic outcomes.  Standing tissue cones were removed as necessary by the standard technique.  A sterile pressure dressing was applied.  The closure was complex due to the closure of multiple tissue layers, the excisions of tissue redundancies, and the high degree of attention (and therefore time) paid to the final surgical outcome.
Stage 2: Additional Anesthesia Volume In Cc: 2
Graft Donor Site Bandage (Optional-Leave Blank If You Don't Want In Note): Steri-strips and a pressure bandage were applied to the donor site.
Intermediate Repair And Flap Additional Text (Will Appearing After The Standard Complex Repair Text): The intermediate repair was not sufficient to completely close the primary defect. The remaining additional defect was repaired with the flap mentioned below.
Flip-Flop Flap Text: The defect edges were debeveled with a #15 blade scalpel.  Given the location of the defect and the proximity to free margins a flip-flop flap was deemed most appropriate. Using a sterile surgical marker, the appropriate flap was drawn incorporating the defect and placing the expected incisions between the helical rim and antihelix where possible.  The area thus outlined was incised through and through with a #15 scalpel blade. Following this, the designed flap was carried over into the primary defect and sutured into place.
Suturegard Body: The suture ends were repeatedly re-tightened and re-clamped to achieve the desired tissue expansion.
Non-Graft Cartilage Fenestration Text: The cartilage was fenestrated with a 2mm punch biopsy to help facilitate healing.
Mastoid Interpolation Flap Text: A decision was made to reconstruct the defect utilizing an interpolation axial flap and a staged reconstruction.  A telfa template was made of the defect.  This telfa template was then used to outline the mastoid interpolation flap.  The donor area for the pedicle flap was then injected with anesthesia.  The flap was excised through the skin and subcutaneous tissue down to the layer of the underlying musculature.  The pedicle flap was carefully excised within this deep plane to maintain its blood supply.  The edges of the donor site were undermined.   The donor site was closed in a primary fashion.  The pedicle was then rotated into position and sutured.  Once the tube was sutured into place, adequate blood supply was confirmed with blanching and refill.  The pedicle was then wrapped with xeroform gauze and dressed appropriately with a telfa and gauze bandage to ensure continued blood supply and protect the attached pedicle.
Island Pedicle Flap With Canthal Suspension Text: The defect edges were debeveled with a #15 scalpel blade. Given the location of the defect, shape of the defect and the proximity to free margins an island pedicle advancement flap was deemed most appropriate. Using a sterile surgical marker, an appropriate advancement flap was drawn incorporating the defect, outlining the appropriate donor tissue and placing the expected incisions within the relaxed skin tension lines where possible. The area thus outlined was incised deep to adipose tissue with a #15 scalpel blade. The skin margins were undermined to an appropriate distance in all directions around the primary defect and laterally outward around the island pedicle utilizing iris scissors.  There was minimal undermining beneath the pedicle flap. A suspension suture was placed in the canthal tendon to prevent tension and prevent ectropion. Following this, the designed flap was placed into the primary defect and sutured into place.
Double M-Plasty Complex Repair Preamble Text (Leave Blank If You Do Not Want): Local anesthesia was obtained. A standard sterile surgical prep and drape were performed.  The existing Mohs beveled edges were sharply debrided to allow 90 degree wound edges. The measured width of the wound was the measured diameter of the wound perpendicular to the line of closing. The tissue surrounding the operative site/primary surgical defect was undermined extensively with blunt scissor dissection.  This extensive undermining was at least the maximum width of the primary surgical defect in at least one direction along an entire wound edge. The orientation of the closure was placed within the relaxed skin tension lines. Bilateral M-Plasties were designed to reduce the length of the surgical wound. The superficial fascia and subcutaneous layers were first closed with buried vertical mattress sutures. The epidermis was then approximated with simple interrupted sutures. Three-point tip stitches were utilized at the M-Plasty sites. Careful attention was given to eversion and even approximation of the edges. A pressure dressing was applied. The closure was complex due to the closure of multiple tissue layers, the excisions of tissue redundancies, and the high degree of attention (and therefore time) paid to the final surgical outcome.
Bcc Micronodular Histology Text: Micronodular nest of basophilic cells within mucinous stroma  by normal intervening collagen consistent with micronodular basal cell carcinoma were noted on microscopic analysis.
Epidermal Closure: running
Simple / Intermediate / Complex Repair - Final Wound Length In Cm: 2.7
Surgical Defect Length In Cm (Optional): 0.7
V-Y Plasty Text: The defect edges were debeveled with a #15 scalpel blade. Given the location of the defect, shape of the defect and the proximity to free margins an V-Y advancement flap was deemed most appropriate. Using a sterile surgical marker, an appropriate advancement flap was drawn incorporating the defect and placing the expected incisions within the relaxed skin tension lines where possible. The area thus outlined was incised deep to adipose tissue with a #15 scalpel blade. The skin margins were undermined to an appropriate distance in all directions utilizing iris scissors. Following this, the designed flap was advanced and carried over into the primary defect and sutured into place.
Partial Purse String (Simple) Text: A purse string closure was determined to be the easiest wound management option, and the risks and benefits were reviewed with the patient.  The operative site was prepped and draped in a standard manner. Undermining was performed circumferentially around the surgical defect. A running subcuticular absorbable suture was placed around the perimeter of the operative defect, allowing the wound to be closed with a single suture in a purse string manner.
Mixed Superficial And Nodular Bcc Histology Text: Basaloid buds from the epidermis with stromal retraction spaces and cords and nests of basophilic cells with peripheral palisading and retraction artifact consistent with mixed superficial and nodular basal cell carcinoma were noted on microscopic analysis.
Scc Poorly Differentiated Histology Text: Infiltrative strands and cords of spindle-shaped epithelial cells with prominent nuclear atypia and no signs of keratinization consistent with poorly differentiated squamous cell carcinoma were noted on microscopic analysis.
Mercedes Flap Text: The defect edges were debeveled with a #15 scalpel blade. Given the location of the defect, shape of the defect and the proximity to free margins a Mercedes flap was deemed most appropriate. Using a sterile surgical marker, an appropriate advancement flap was drawn incorporating the defect and placing the expected incisions within the relaxed skin tension lines where possible. The area thus outlined was incised deep to adipose tissue with a #15 scalpel blade. The skin margins were undermined to an appropriate distance in all directions utilizing iris scissors. Following this, the designed flap was advanced and carried over into the primary defect and sutured into place.
Closure 2 Information: This tab is for additional flaps and grafts, including complex repair and grafts and complex repair and flaps. You can also specify a different location for the additional defect, if the location is the same you do not need to select a new one. We will insert the automated text for the repair you select below just as we do for solitary flaps and grafts. Please note that at this time if you select a location with a different insurance zone you will need to override the ICD10 and CPT if appropriate.
Double O-Z Flap Text: The defect edges were debeveled with a #15 scalpel blade. Given the location of the defect, shape of the defect and the proximity to free margins a Double O-Z flap was deemed most appropriate. Using a sterile surgical marker, an appropriate transposition flap was drawn incorporating the defect and placing the expected incisions within the relaxed skin tension lines where possible. The area thus outlined was incised deep to adipose tissue with a #15 scalpel blade. The skin margins were undermined to an appropriate distance in all directions utilizing iris scissors. Following this, the designed flap was carried over into the primary defect and sutured into place.
Bcc Histology Text: Nest and cords of basophilic cells with peripheral palisading and retraction artifact consistent with basal cell carcinoma were noted on microscopic analysis.
Tarsorrhaphy Text: A tarsorrhaphy was performed using Frost sutures.
Estlander Flap (Lower To Upper Lip) Text: The defect of the lower lip was assessed and measured.  Given the location and size of the defect, an Estlander flap was deemed most appropriate. Using a sterile surgical marker, an appropriate Estlander flap was measured and drawn on the upper lip. Local anesthesia was then infiltrated. A scalpel was then used to incise the lateral aspect of the flap, through skin, muscle and mucosa, leaving the flap pedicled medially.  The flap was then rotated and positioned to fill the lower lip defect.  The flap was then sutured into place with a three layer technique, closing the orbicularis oris muscle layer with subcutaneous buried sutures, followed by a mucosal layer and an epidermal layer.
Staging Info: By selecting yes to the question above you will include information on AJCC 8 tumor staging in your Mohs note. Information on tumor staging will be automatically added for SCCs on the head and neck. AJCC 8 includes tumor size, tumor depth, perineural involvement and bone invasion.
Abbe Flap (Lower To Upper Lip) Text: The defect of the upper lip was assessed and measured.  Given the location and size of the defect, an Abbe flap was deemed most appropriate. Using a sterile surgical marker, an appropriate Abbe flap was measured and drawn on the lower lip. Local anesthesia was then infiltrated. A scalpel was then used to incise the upper lip through and through the skin, vermilion, muscle and mucosa, leaving the flap pedicled on the opposite side.  The flap was then rotated and transferred to the lower lip defect.  The flap was then sutured into place with a three layer technique, closing the orbicularis oris muscle layer with subcutaneous buried sutures, followed by a mucosal layer and an epidermal layer.
Consent (Marginal Mandibular)/Introductory Paragraph: The rationale for Mohs was explained to the patient and consent was obtained. The risks, benefits and alternatives to therapy were discussed in detail. Specifically, the risks of damage to the marginal mandibular branch of the facial nerve, infection, scarring, bleeding, prolonged wound healing, incomplete removal, allergy to anesthesia, and recurrence were addressed. Prior to the procedure, the treatment site was clearly identified and confirmed by the patient. All components of Universal Protocol/PAUSE Rule completed.
Burow's Advancement Flap Text: The defect edges were debeveled with a #15 scalpel blade. Given the location of the defect and the proximity to free margins a Burow's advancement flap was deemed most appropriate. Using a sterile surgical marker, the appropriate advancement flap was drawn incorporating the defect and placing the expected incisions within the relaxed skin tension lines where possible. The area thus outlined was incised deep to adipose tissue with a #15 scalpel blade. The skin margins were undermined to an appropriate distance in all directions utilizing iris scissors. Following this, the designed flap was advanced and carried over into the primary defect and sutured into place.
Donor Site Anesthesia Type: same as repair anesthesia
Consent Type: Consent 1 (Standard)
Adjacent Tissue Transfer Text: The defect edges were debeveled with a #15 scalpel blade. Given the location of the defect and the proximity to free margins an adjacent tissue transfer was deemed most appropriate. Using a sterile surgical marker, an appropriate flap was drawn incorporating the defect and placing the expected incisions within the relaxed skin tension lines where possible. The area thus outlined was incised deep to adipose tissue with a #15 scalpel blade. The skin margins were undermined to an appropriate distance in all directions utilizing iris scissors and carried over to close the primary defect.
Epidermal Sutures: 5-0 Ethilon
Bilobed Flap Text: A flap repair was deemed to be medically appropriate because of the wound's location and size/depth and the need to repair the wound to correct the deformity that resulted from the excision of a malignant skin lesion. Simpler repair options were not available without introducing the possibilities of local tissue distortion or functional/aesthetic compromise. Local anesthesia was obtained. A standard sterile surgical prep and drape were performed. An appropriate bilobed flap was designed. This flap design required separate and distinct additional incisions with a scalpel adjacent to the primary surgical defect to liberate sufficient local tissue to permit wound closure. These additional incisions created a secondary surgical defect that also required wound closure. The bilobed flap was incised to the underlying perichondrial or periosteal tissue. The flap was then undermined and elevated to its pedicle base. Careful attention was paid to preserve the delicate vascular/muscular base of the flap. The entire tissue surrounding the operative site and the secondary defect were undermined extensively with blunt scissor dissection. Careful attention was paid to the location of underlying important  anatomic structures.  Hemostasis was obtained using electrocoagulation. The flap was then transposed into the primary and secondary defects. The tertiary defect was initially closed with buried sutures. The secondary lobe was transposed to cover the secondary defect created by the first lobe of the flap. The primary lobe of the flap was then transposed over the primary defect. Both lobes of the flap were trimmed to fit the recipient sites as needed. Raised tissue cones (dog-ears) were removed as necessary by a standard technique. The superficial fascia and subcutaneous layers of both the primary and secondary defects were closed with buried vertical mattress sutures. The epidermis was then approximated with a running suture. Careful attention was given to wound edge eversion and even approximation of the wound edges. A sterile pressure dressing was applied.  The flap billing was determined by the sizes of the primary and the created secondary defects at the time of wound closure.
Anesthesia Volume In Cc: 3
Area L Indication Text: Tumors in this location are included in Area L (trunk and extremities).  Mohs surgery is indicated for larger tumors, or tumors with aggressive histologic features, in these anatomic locations.
Wound Care: Petrolatum
Epidermal Autograft Text: The defect edges were debeveled with a #15 scalpel blade. Given the location of the defect, shape of the defect and the proximity to free margins an epidermal autograft was deemed most appropriate. Using a sterile surgical marker, the primary defect shape was transferred to the donor site. The epidermal graft was then harvested.  The skin graft was then placed in the primary defect and oriented appropriately.
Bcc Keratotic Histology Text: Nest and cords of basophilic cells with areas of squamatization with large eosinophilic keratinocytes and keratin kena formation consistent with keratotic basal cell carcinoma  were noted on microscopic analysis.
Nasalis Myocutaneous Flap Text: Using a #15 blade, an incision was made around the donor flap to the level of the nasalis muscle. Wide lateral undermining was then performed in both the subcutaneous plane above the nasalis muscle, and in a submuscular plane just above periosteum. This allowed the formation of a free nasalis muscle axial pedicle which was still attached to the actual cutaneous flap, increasing its mobility and vascular viability. Hemostasis was obtained with pinpoint electrocoagulation. The flap was mobilized into position and the pivotal anchor points positioned and stabilized with buried interrupted sutures. Subcutaneous and dermal tissues were closed in a multilayered fashion with sutures. Tissue redundancies were excised, and the epidermal edges were apposed without significant tension and sutured with sutures.
Alar Island Pedicle Flap Text: The defect edges were debeveled with a #15 scalpel blade. Given the location of the defect, shape of the defect and the proximity to the alar rim an island pedicle advancement flap was deemed most appropriate. Using a sterile surgical marker, an appropriate advancement flap was drawn incorporating the defect, outlining the appropriate donor tissue and placing the expected incisions within the nasal ala running parallel to the alar rim. The area thus outlined was incised with a #15 scalpel blade. The skin margins were undermined minimally to an appropriate distance in all directions around the primary defect and laterally outward around the island pedicle utilizing iris scissors.  There was minimal undermining beneath the pedicle flap. Following this, the designed flap was carried over into the primary defect and sutured into place.
Intermediate Repair Preamble Text (Leave Blank If You Do Not Want): This repair was necessary for closure of subcutaneous tissue and superficial non-muscular fascia, to close large gap created by lesion removal, and to preserve normal anatomy. Local anesthesia was obtained. The tissue surrounding the operative site was undermined with blunt scissor dissection. The extent of undermining was less than the width of the primary surgical defect along at least one entire edge of the surgical defect. The orientation of the closure was placed within the relaxed skin tension lines. The superficial fascia and subcutaneous layers were first closed with buried vertical mattress sutures. The epidermis was then approximated with simple interrupted or running sutures. Careful attention was given to eversion and even approximation of the edges. Standing tissue cones were removed as necessary by standard technique. A pressure dressing was applied.
Tissue Cultured Epidermal Autograft Text: The defect edges were debeveled with a #15 scalpel blade. Given the location of the defect, shape of the defect and the proximity to free margins a tissue cultured epidermal autograft was deemed most appropriate.  The graft was then trimmed to fit the size of the defect.  The graft was then placed in the primary defect and oriented appropriately.
Tumor Debulked?: curette
Ftsg Text: A full-thickness skin graft repair was deemed to be medically appropriate because of the wound's location and size/depth. Simpler repair options were not available without introducing the possibilities of local tissue distortion or functional/aesthetic compromise. A wound size template was made using careful measuring. A template was then drawn onto the appropriate donor site with gentian violet. Local anesthesia was obtained at both the donor and recipient sites. Standard sterile surgical preps and drapes were then performed at both donor and recipient sites.  The excision at the donor site was carried down through the subcutaneous fat. The harvested graft was placed on sterile, saline-soaked gauze until it was needed.  Hemostasis was obtained at the donor site using electrocoagulation. The donor site was closed by complex layered closure or by purse-string closure (unless the graft was harvested from the conchal bowl, and at that site, the wound was allowed to heal by second intention). The graft was thinned with sharp scissors in order to remove all attached subcutaneous fat. After appropriate preparation of the recipient site, the graft was subsequently placed on the recipient site and secured with interrupted and running sutures. The graft was trimmed to fit as needed with scissors. Careful attention was given to even approximation of the wound edges. In some instances, a tie-over bolster dressing was applied in order to stabilize the graft and facilitate its survival.  A sterile pressure dressing was applied to both donor and recipient sites.
A-T Advancement Flap Text: The defect edges were debeveled with a #15 scalpel blade. Given the location of the defect, shape of the defect and the proximity to free margins an A-T advancement flap was deemed most appropriate. Using a sterile surgical marker, an appropriate advancement flap was drawn incorporating the defect and placing the expected incisions within the relaxed skin tension lines where possible. The area thus outlined was incised deep to adipose tissue with a #15 scalpel blade. The skin margins were undermined to an appropriate distance in all directions utilizing iris scissors. Following this, the designed flap was advanced and carried over into the primary defect and sutured into place.
Consent (Temporal Branch)/Introductory Paragraph: The rationale for Mohs was explained to the patient and consent was obtained. The risks, benefits and alternatives to therapy were discussed in detail. Specifically, the risks of damage to the temporal branch of the facial nerve, infection, scarring, bleeding, prolonged wound healing, incomplete removal, allergy to anesthesia, and recurrence were addressed. Prior to the procedure, the treatment site was clearly identified and confirmed by the patient. All components of Universal Protocol/PAUSE Rule completed.
Split-Thickness Skin Graft Text: The defect edges were debeveled with a #15 scalpel blade. Given the location of the defect, shape of the defect and the proximity to free margins a split thickness skin graft was deemed most appropriate. Using a sterile surgical marker, the primary defect shape was transferred to the donor site. The split thickness graft was then harvested.  The skin graft was then placed in the primary defect and oriented appropriately.
Previous Accession (Optional): [id number]
Advancement Flap (Single) Text: A flap repair was deemed to be medically appropriate because of the wound's location and size/depth and the need to repair the wound to correct the deformity that resulted from the excision of a malignant skin lesion. Simpler repair options were not available without introducing the possibilities of local tissue distortion or functional/aesthetic compromise. Local anesthesia was obtained. A standard sterile surgical prep and drape were performed. An appropriate advancement flap was designed. This flap design required separate and distinct additional incisions with a scalpel adjacent to the primary surgical defect to liberate sufficient local tissue to permit wound closure. These additional incisions created a secondary surgical defect that also required wound closure.  The flap was incised sharply to the depth of the underlying adipose tissue tissue and the areas of the primary and secondary defects were undermined extensively with blunt scissor dissection.  The flap was then elevated. Careful attention was paid to the location of underlying important  anatomic structures.  Hemostasis was obtained using electrocoagulation. The flap was then advanced into the primary defect. Raised tissue cones (dog-ears) were removed as necessary by a standard technique. The superficial fascia and subcutaneous layers of both the primary and secondary defects were closed with buried vertical mattress sutures. The epidermis was then approximated with a running suture. Careful attention was given to wound edge eversion and even approximation of the wound edges. A sterile pressure dressing was applied.  The flap billing was determined by the sizes of the primary and the created secondary defects at the time of wound closure.
Consent 3/Introductory Paragraph: I gave the patient a chance to ask questions they had about the procedure.  Following this I explained the Mohs procedure and consent was obtained. The risks, benefits and alternatives to therapy were discussed in detail. Specifically, the risks of infection, scarring, bleeding, prolonged wound healing, incomplete removal, allergy to anesthesia, nerve injury and recurrence were addressed. Prior to the procedure, the treatment site was clearly identified and confirmed by the patient. All components of Universal Protocol/PAUSE Rule completed.
Consent 1/Introductory Paragraph: The rationale for Mohs was explained to the patient and consent was obtained. The risks, benefits and alternatives to therapy were discussed in detail. Specifically, the risks of infection, scarring, bleeding, prolonged wound healing, incomplete removal, allergy to anesthesia, nerve injury and recurrence were addressed. Prior to the procedure, the treatment site was clearly identified and confirmed by the patient. All components of Universal Protocol/PAUSE Rule completed.
Consent (Spinal Accessory)/Introductory Paragraph: The rationale for Mohs was explained to the patient and consent was obtained. The risks, benefits and alternatives to therapy were discussed in detail. Specifically, the risks of damage to the spinal accessory nerve, infection, scarring, bleeding, prolonged wound healing, incomplete removal, allergy to anesthesia, and recurrence were addressed. Prior to the procedure, the treatment site was clearly identified and confirmed by the patient. All components of Universal Protocol/PAUSE Rule completed.
Unna Boot Text: An Unna boot was placed to help immobilize the limb and facilitate more rapid healing.
Ear Star Wedge Flap Text: The defect edges were debeveled with a #15 blade scalpel.  Given the location of the defect and the proximity to free margins (helical rim) an ear star wedge flap was deemed most appropriate. Using a sterile surgical marker, the appropriate flap was drawn incorporating the defect and placing the expected incisions between the helical rim and antihelix where possible.  The area thus outlined was incised through and through with a #15 scalpel blade. Following this, the designed flap was carried over into the primary defect and sutured into place.
Chonodrocutaneous Helical Advancement Flap Text: The defect edges were debeveled with a #15 scalpel blade. Given the location of the defect and the proximity to free margins a chondrocutaneous helical advancement flap was deemed most appropriate. Using a sterile surgical marker, the appropriate advancement flap was drawn incorporating the defect and placing the expected incisions within the relaxed skin tension lines where possible. The area thus outlined was incised deep to adipose tissue with a #15 scalpel blade. The skin margins were undermined to an appropriate distance in all directions utilizing iris scissors. Following this, the designed flap was advanced and carried over into the primary defect and sutured into place.
Paramedian Forehead Flap Text: A decision was made to reconstruct the defect utilizing an interpolation axial flap and a staged reconstruction.  A telfa template was made of the defect.  This telfa template was then used to outline the paramedian forehead pedicle flap.  The donor area for the pedicle flap was then injected with anesthesia.  The flap was excised through the skin and subcutaneous tissue down to the layer of the underlying musculature.  The pedicle flap was carefully excised within this deep plane to maintain its blood supply.  The edges of the donor site were undermined.   The donor site was closed in a primary fashion.  The pedicle was then rotated into position and sutured.  Once the tube was sutured into place, adequate blood supply was confirmed with blanching and refill.  The pedicle was then wrapped with xeroform gauze and dressed appropriately with a telfa and gauze bandage to ensure continued blood supply and protect the attached pedicle.
Full Thickness Lip Wedge Repair (Flap) Text: Given the location of the defect and the proximity to free margins a full thickness wedge repair was deemed most appropriate. Using a sterile surgical marker, the appropriate repair was drawn incorporating the defect and placing the expected incisions perpendicular to the vermilion border.  The vermilion border was also meticulously outlined to ensure appropriate reapproximation during the repair.  The area thus outlined was incised through and through with a #15 scalpel blade.  The muscularis and dermis were reaproximated with deep sutures following hemostasis. Care was taken to realign the vermilion border before proceeding with the superficial closure.  Once the vermilion was realigned the superfical and mucosal closure was finished.
Rotation Flap Text: A flap repair was deemed to be medically appropriate because of the wound's location and size/depth and the need to repair the wound to correct the deformity that resulted from the excision of a malignant skin lesion. Simpler repair options were not available without introducing the possibilities of local tissue distortion or functional/aesthetic compromise. Local anesthesia was obtained. A standard sterile surgical prep and drape were performed. An rotation flap was designed.  This flap design required separate and distinct additional incisions with a scalpel adjacent to the primary surgical defect to liberate sufficient local tissue to permit wound closure. These additional incisions created a secondary surgical defect that also required wound closure.  The flap was incised sharply to the depth of the underlying adipose tissue tissue and the areas of the primary and secondary defects were undermined extensively with blunt scissor dissection. The flap was incised and then elevated. Careful attention was paid to the location of underlying important  anatomic structures.  Hemostasis was obtained using electrocoagulation. The flap was then rotated into the primary defect. Raised tissue cones (dog-ears) were removed as necessary by a standard technique. The superficial fascia and subcutaneous layers of both the primary and secondary defects were closed with buried vertical mattress sutures. The epidermis was then approximated with a running suture. Careful attention was given to wound edge eversion and even approximation of the wound edges. A sterile pressure dressing was applied.  The flap billing was determined by the sizes of the primary and the created secondary defects at the time of wound closure.
Crescentic Advancement Flap Text: The defect edges were debeveled with a #15 scalpel blade. Given the location of the defect and the proximity to free margins a crescentic advancement flap was deemed most appropriate. Using a sterile surgical marker, the appropriate advancement flap was drawn incorporating the defect and placing the expected incisions within the relaxed skin tension lines where possible. The area thus outlined was incised deep to adipose tissue with a #15 scalpel blade. The skin margins were undermined to an appropriate distance in all directions utilizing iris scissors. Following this, the designed flap was advanced and carried over into the primary defect and sutured into place.
Date Of Previous Biopsy (Optional): 03/04/25
Helical Rim Advancement Flap Text: A flap repair was deemed to be medically appropriate because of the wound's location and size/depth and the need to repair the wound to correct the deformity that resulted from the excision of a malignant skin lesion. Simpler repair options were not available without introducing the possibilities of local tissue distortion or functional/aesthetic compromise. Local anesthesia was obtained. A standard sterile surgical prep and drape were performed. An appropriate helical rim advancement flap was designed. This flap design required separate and distinct additional incisions with a scalpel adjacent to the primary surgical defect to liberate sufficient local tissue to permit wound closure. These additional incisions created a secondary surgical defect that also required wound closure.  The flap was incised sharply through the skin and cartilage in the helical rim sulcus to the depth of the underlying adipose tissue tissue, and the areas of the primary and secondary defects were undermined extensively with blunt scissor dissection.  The flap was then elevated. Careful attention was paid to the location of underlying important  anatomic structures.  Hemostasis was obtained using electrocoagulation. The flap was then advanced into the primary defect. Raised standing tissue cones (dog-ears) were removed as necessary by a standard technique. The cartilage, superficial fascia, and subcutaneous layers of both the primary and secondary defects were closed with buried vertical mattress sutures. The epidermis was then approximated with a running suture. Careful attention was given to wound edge eversion and even approximation of the wound edges. A sterile pressure dressing was applied.  The flap billing was determined by the combined sizes of the primary and the created secondary defects at the time of wound closure.
Secondary Intention Text (Leave Blank If You Do Not Want): The defect will heal with secondary intention.
Manual Repair Warning Statement: We plan on removing the manually selected variable below in favor of our much easier automatic structured text blocks found in the previous tab. We decided to do this to help make the flow better and give you the full power of structured data. Manual selection is never going to be ideal in our platform and I would encourage you to avoid using manual selection from this point on, especially since I will be sunsetting this feature. It is important that you do one of two things with the customized text below. First, you can save all of the text in a word file so you can have it for future reference. Second, transfer the text to the appropriate area in the Library tab. Lastly, if there is a flap or graft type which we do not have you need to let us know right away so I can add it in before the variable is hidden. No need to panic, we plan to give you roughly 6 months to make the change.
M-Plasty Complex Repair Preamble Text (Leave Blank If You Do Not Want): Local anesthesia was obtained. A standard sterile surgical prep and drape were performed.  The existing Mohs beveled edges were sharply debrided to allow 90 degree wound edges. The measured width of the wound was the measured diameter of the wound perpendicular to the line of closing. The tissue surrounding the operative site/primary surgical defect was undermined extensively with blunt scissor dissection.  This extensive undermining was at least the maximum width of the primary surgical defect in at least one direction along an entire wound edge. The orientation of the closure was placed within the relaxed skin tension lines. An M-Plasty was designed to reduce the length of the surgical wound. The superficial fascia and subcutaneous layers were first closed using buried vertical mattress sutures. The epidermis was then approximated with simple interrupted sutures. A three-point tip stich was utilized at the M-Plasty site. Careful attention was given to eversion and even approximation of the edges. Standing tissue cones were removed as necessary by standard technique. The closure was complex due to the closure of multiple tissue layers, the excisions of tissue redundancies, and the high degree of attention (and therefore time) paid to the final surgical outcome.
Surgeon Performing Repair (Optional): Sakina Callaway M.D.
Ear Wedge Repair Text: A wedge excision was completed by carrying down an excision through the full thickness of the ear and cartilage with an inward facing Burow's triangle. The wound was then closed in a layered fashion.
Staged Advancement Flap Text: The defect edges were debeveled with a #15 scalpel blade. Given the location of the defect, shape of the defect and the proximity to free margins a staged advancement flap was deemed most appropriate. Using a sterile surgical marker, an appropriate advancement flap was drawn incorporating the defect and placing the expected incisions within the relaxed skin tension lines where possible. The area thus outlined was incised deep to adipose tissue with a #15 scalpel blade. The skin margins were undermined to an appropriate distance in all directions utilizing iris scissors. Following this, the designed flap was carried over into the primary defect and sutured into place.
Mart-1 - Negative Histology Text: MART-1 staining demonstrates a normal density and pattern of melanocytes along the dermal-epidermal junction. The surgical margins are negative for tumor cells.
Bilateral Rotation Flap Text: The defect edges were debeveled with a #15 scalpel blade. Given the location of the defect, shape of the defect and the proximity to free margins a bilateral rotation flap was deemed most appropriate. Using a sterile surgical marker, an appropriate rotation flap was drawn incorporating the defect and placing the expected incisions within the relaxed skin tension lines where possible. The area thus outlined was incised deep to adipose tissue with a #15 scalpel blade. The skin margins were undermined to an appropriate distance in all directions utilizing iris scissors. Following this, the designed flap was carried over into the primary defect and sutured into place.
Consent (Ear)/Introductory Paragraph: The rationale for Mohs was explained to the patient and consent was obtained. The risks, benefits and alternatives to therapy were discussed in detail. Specifically, the risks of ear deformity, infection, scarring, bleeding, prolonged wound healing, incomplete removal, allergy to anesthesia, nerve injury and recurrence were addressed. Prior to the procedure, the treatment site was clearly identified and confirmed by the patient. All components of Universal Protocol/PAUSE Rule completed.
Suturegard Intro: Intraoperative tissue expansion was performed, utilizing the SUTUREGARD device, in order to reduce wound tension.
Mohs Method Verbiage: A thin layer of tissue was surgically excised around the demarcated area using a #15 blade. The tissue specimen was then transferred onto a gauze maintaining the orientation of the specimen. The gauze was anatomically marked so that proper orientation could be maintained. Hemostasis was obtained using electro-coagulation. The wound site was then covered with a sterile dressing while tissue samples were processed for examination.
No Repair - Repaired With Adjacent Surgical Defect Text (Leave Blank If You Do Not Want): After obtaining clear surgical margins the defect was repaired concurrently with another surgical defect which was in close approximation.
Hemigard Intro: Due to skin fragility and wound tension, it was decided to use HEMIGARD adhesive retention suture devices to permit a linear closure. The skin was cleaned and dried for a 6cm distance away from the wound. Excessive hair, if present, was removed to allow for adhesion.
V-Y Flap Text: The defect edges were debeveled with a #15 scalpel blade. Given the location of the defect, shape of the defect and the proximity to free margins a V-Y flap was deemed most appropriate. Using a sterile surgical marker, an appropriate advancement flap was drawn incorporating the defect and placing the expected incisions within the relaxed skin tension lines where possible. The area thus outlined was incised deep to adipose tissue with a #15 scalpel blade. The skin margins were undermined to an appropriate distance in all directions utilizing iris scissors. Following this, the designed flap was advanced and carried over into the primary defect and sutured into place.
Melolabial Transposition Flap Text: The defect edges were debeveled with a #15 scalpel blade. Given the location of the defect and the proximity to free margins a melolabial flap was deemed most appropriate. Using a sterile surgical marker, an appropriate melolabial transposition flap was drawn incorporating the defect. The area thus outlined was incised deep to adipose tissue with a #15 scalpel blade. The skin margins were undermined to an appropriate distance in all directions utilizing iris scissors. Following this, the designed flap was carried over into the primary defect and sutured into place.
Dorsal Nasal Flap Text: The defect edges were debeveled with a #15 scalpel blade. Given the location of the defect and the proximity to free margins a dorsal nasal flap was deemed most appropriate. Using a sterile surgical marker, an appropriate dorsal nasal flap was drawn around the defect. The area thus outlined was incised deep to adipose tissue with a #15 scalpel blade. The skin margins were undermined to an appropriate distance in all directions utilizing iris scissors. Following this, the designed flap was carried into the primary defect and sutured into place.
Subsequent Stages Histo Method Verbiage: The patient was returned to the operating room, and prepped in the manner described above. Surgery was directed to the areas having residual tumor, with thin layers of tissue being excised from these regions. A new reference map was prepared during the surgery to maintain precise orientation as described above. Hemostasis was achieved and the excised tissue was processed as described above for microscopic analysis. These sections were then examined by the Mohs surgeon, and areas of persistent tumor were indicated on the reference map as described above. A surgical time-out was performed.
Surgical Defect Width In Cm (Optional): 1.0
Area H Indication Text: Tumors in this location are included in Area H (eyelids, eyebrows, nose, lips, chin, ear, pre-auricular, post-auricular, temple, genitalia, hands, feet, ankles and areola).  Tissue conservation is critical in these anatomic locations.
Postop Diagnosis: same
Complex Repair And Graft Additional Text (Will Appearing After The Standard Complex Repair Text): The complex repair was not sufficient to completely close the primary defect. The remaining additional defect was repaired with the graft mentioned below.
O-L Flap Text: A flap repair was deemed to be medically appropriate because of the wound's location and size/depth and the need to repair the wound to correct the deformity that resulted from the excision of a malignant skin lesion. Simpler repair options were not available without introducing the possibilities of local tissue distortion or functional/aesthetic compromise. Local anesthesia was obtained. A standard sterile surgical prep and drape were performed. An O-T rotation flap was designed.  This flap design required separate and distinct additional incisions with a scalpel adjacent to the primary surgical defect to liberate sufficient local tissue to permit wound closure. These additional incisions created a secondary surgical defect that also required wound closure.  The flap was incised sharply to the depth of the underlying adipose tissue tissue and the areas of the primary and secondary defects were undermined extensively with blunt scissor dissection.  The flap was then elevated. Careful attention was paid to the location of underlying important  anatomic structures.  Hemostasis was obtained using electrocoagulation. The flap was then advanced into the primary defect. Raised tissue cones (dog-ears) were removed as necessary by a standard technique. The superficial fascia and subcutaneous layers of both the primary and secondary defects were closed with buried vertical mattress sutures. The epidermis was then approximated with a running suture. Careful attention was given to wound edge eversion and even approximation of the wound edges. A sterile pressure dressing was applied.  The flap billing was determined by the sizes of the primary and the created secondary defects at the time of wound closure.
Double Island Pedicle Flap Text: The defect edges were debeveled with a #15 scalpel blade. Given the location of the defect, shape of the defect and the proximity to free margins a double island pedicle advancement flap was deemed most appropriate. Using a sterile surgical marker, an appropriate advancement flap was drawn incorporating the defect, outlining the appropriate donor tissue and placing the expected incisions within the relaxed skin tension lines where possible. The area thus outlined was incised deep to adipose tissue with a #15 scalpel blade. The skin margins were undermined to an appropriate distance in all directions around the primary defect and laterally outward around the island pedicle utilizing iris scissors.  There was minimal undermining beneath the pedicle flap. Following this, the flap was carried over into the primary defect and sutured into place.
Bcc  Nodular Histology Text: Nests of basophilic cells with peripheral palisading and retraction artifact consistent with basal cell carcinoma were noted on microscopic analysis.
Retention Suture Text: Retention sutures were placed to support the closure and prevent dehiscence.
Localized Dermabrasion With Sand Papertext: The patient was draped in routine manner.  Localized dermabrasion using sterile sand paper was performed in routine manner to papillary dermis. This spot dermabrasion is being performed to complete skin cancer reconstruction. It also will eliminate the other sun damaged precancerous cells that are known to be part of the regional effect of a lifetime's worth of sun exposure. This localized dermabrasion is therapeutic and should not be considered cosmetic in any regard.
Partial Purse String (Intermediate) Text: Given the location of the defect and the characteristics of the surrounding skin an intermediate purse string closure was deemed most appropriate.  Undermining was performed circumferentially around the surgical defect.  A purse string suture was then placed and tightened. Wound tension only allowed a partial closure of the circular defect.
Island Pedicle Flap-Requiring Vessel Identification Text: The defect edges were debeveled with a #15 scalpel blade. Given the location of the defect, shape of the defect and the proximity to free margins an island pedicle advancement flap was deemed most appropriate. Using a sterile surgical marker, an appropriate advancement flap was drawn, based on the axial vessel mentioned above, incorporating the defect, outlining the appropriate donor tissue and placing the expected incisions within the relaxed skin tension lines where possible. The area thus outlined was incised deep to adipose tissue with a #15 scalpel blade. The skin margins were undermined to an appropriate distance in all directions around the primary defect and laterally outward around the island pedicle utilizing iris scissors.  There was minimal undermining beneath the pedicle flap. Following this, the designed flap was carried over into the primary defect and sutured into place.
Detail Level: Detailed
Undermining Type: Entire Wound
Consent (Scalp)/Introductory Paragraph: The rationale for Mohs was explained to the patient and consent was obtained. The risks, benefits and alternatives to therapy were discussed in detail. Specifically, the risks of changes in hair growth pattern secondary to repair, infection, scarring, bleeding, prolonged wound healing, incomplete removal, allergy to anesthesia, nerve injury and recurrence were addressed. Prior to the procedure, the treatment site was clearly identified and confirmed by the patient. All components of Universal Protocol/PAUSE Rule completed.
O-T Advancement Flap Text: The defect edges were debeveled with a #15 scalpel blade. Given the location of the defect, shape of the defect and the proximity to free margins an O-T advancement flap was deemed most appropriate. Using a sterile surgical marker, an appropriate advancement flap was drawn incorporating the defect and placing the expected incisions within the relaxed skin tension lines where possible. The area thus outlined was incised deep to adipose tissue with a #15 scalpel blade. The skin margins were undermined to an appropriate distance in all directions utilizing iris scissors. Following this, the designed flap was advanced and carried over into the primary defect and sutured into place.
Muscle Hinge Flap Text: The defect edges were debeveled with a #15 scalpel blade.  Given the size, depth and location of the defect and the proximity to free margins a muscle hinge flap was deemed most appropriate. Using a sterile surgical marker, an appropriate hinge flap was drawn incorporating the defect. The area thus outlined was incised with a #15 scalpel blade. The skin margins were undermined to an appropriate distance in all directions utilizing iris scissors. Following this, the designed flap was carried into the primary defect and sutured into place.
Bi-Rhombic Flap Text: The defect edges were debeveled with a #15 scalpel blade. Given the location of the defect and the proximity to free margins a bi-rhombic flap was deemed most appropriate. Using a sterile surgical marker, an appropriate rhombic flap was drawn incorporating the defect. The area thus outlined was incised deep to adipose tissue with a #15 scalpel blade. The skin margins were undermined to an appropriate distance in all directions utilizing iris scissors. Following this, the designed flap was carried over into the primary defect and sutured into place.
Bcc Pigmented Histology Text: Nest and cords of basophilic cells with peripheral palisading and retraction artifact with aggregates of melanin in the nests and dermal melanophages consistent with pigmented basal cell carcinoma were noted on microscopic analysis.

## 2025-03-12 NOTE — HPI: SKIN LESION (BASAL CELL CARCINOMA)
Is This A New Presentation, Or A Follow-Up?: Basal Cell Carcinoma
When Was Basal Cell Biopsied? (Optional): 03/04/25
Accession # (Optional): [id number]

## 2025-03-12 NOTE — PROCEDURE: SURGICAL DECISION MAKING
Discussion: - The biopsy site was prospectively identified by the patient and confirmed by the physician (using a mirror as needed). The surgical site was outlined with a gentian violet marking pen and confirmed by the patient.\\n- I have prospectively informed the patient that the surgical defect created to remove this tumor may be quite large, and that such a large wound might require significant efforts to reconstruct. The potentials for scarring and distortion were emphasized to the patient.\\n\\n- A surgical history and physical were performed. The reasons for proposed surgery, therapeutic alternatives, risks including recurrence, and the potential need for an additional reparative operation or operations, by myself or another physician upon its completion, were discussed with the patient prior to initiation of any care. All questions were answered and verbal and written informed consent to operate were obtained.
Complexity (Necessary For Coding; Major - 90 Day Global With Some Exceptions; Minor - 10 Day Global): major
Date Of Surgery - Today Or Tomorrow?: today
Identified Risk Factors Documented?: yes
Risk Assessment Explanation (Does Not Render In The Note): Clinical determination of the probability and/or consequences of an event, such as surgery. Clinical assessment of the level of risk is affected by the nature of the event under consideration for the patient. Modifier 57 is used to indicate an Evaluation and Management (E/M) service resulted in the initial decision to perform surgery either the day before a major surgery (90 day global) or the day of a major surgery.

## 2025-03-19 ENCOUNTER — APPOINTMENT (OUTPATIENT)
Dept: URBAN - METROPOLITAN AREA CLINIC 29 | Facility: CLINIC | Age: 50
Setting detail: DERMATOLOGY
End: 2025-03-19

## 2025-03-19 DIAGNOSIS — Z48.817 ENCOUNTER FOR SURGICAL AFTERCARE FOLLOWING SURGERY ON THE SKIN AND SUBCUTANEOUS TISSUE: ICD-10-CM

## 2025-03-19 PROCEDURE — ? POST-OP WOUND CHECK

## 2025-03-19 PROCEDURE — ? PHOTO-DOCUMENTATION

## 2025-03-19 PROCEDURE — ? SUTURE REMOVAL (GLOBAL PERIOD)

## 2025-03-19 PROCEDURE — ? RETURN TO REFERRING PROVIDER

## 2025-03-19 ASSESSMENT — LOCATION ZONE DERM: LOCATION ZONE: FACE

## 2025-03-19 ASSESSMENT — LOCATION SIMPLE DESCRIPTION DERM: LOCATION SIMPLE: RIGHT FOREHEAD

## 2025-03-19 ASSESSMENT — LOCATION DETAILED DESCRIPTION DERM: LOCATION DETAILED: RIGHT SUPERIOR FOREHEAD

## 2025-03-19 NOTE — PROCEDURE: SUTURE REMOVAL (GLOBAL PERIOD)
Detail Level: Detailed
Add 57851 Cpt? (Important Note: In 2017 The Use Of 76405 Is Being Tracked By Cms To Determine Future Global Period Reimbursement For Global Periods): no

## 2025-03-19 NOTE — PROCEDURE: POST-OP WOUND CHECK
Detail Level: Detailed
Add 71799 Cpt? (Important Note: In 2017 The Use Of 09363 Is Being Tracked By Cms To Determine Future Global Period Reimbursement For Global Periods): no
Wound Evaluated By: Sakina Callaway M.D.